# Patient Record
Sex: FEMALE | Race: WHITE | NOT HISPANIC OR LATINO | Employment: OTHER | URBAN - METROPOLITAN AREA
[De-identification: names, ages, dates, MRNs, and addresses within clinical notes are randomized per-mention and may not be internally consistent; named-entity substitution may affect disease eponyms.]

---

## 2018-03-09 ENCOUNTER — LAB REQUISITION (OUTPATIENT)
Dept: LAB | Facility: HOSPITAL | Age: 83
End: 2018-03-09

## 2018-03-09 DIAGNOSIS — Z00.00 ROUTINE GENERAL MEDICAL EXAMINATION AT A HEALTH CARE FACILITY: ICD-10-CM

## 2018-03-09 LAB
ALBUMIN SERPL-MCNC: 3.8 G/DL (ref 3.2–4.8)
ALBUMIN/GLOB SERPL: 1.5 G/DL (ref 1.5–2.5)
ALP SERPL-CCNC: 214 U/L (ref 25–100)
ALT SERPL W P-5'-P-CCNC: 16 U/L (ref 7–40)
ANION GAP SERPL CALCULATED.3IONS-SCNC: 8 MMOL/L (ref 3–11)
AST SERPL-CCNC: 20 U/L (ref 0–33)
BASOPHILS # BLD AUTO: 0.04 10*3/MM3 (ref 0–0.2)
BASOPHILS NFR BLD AUTO: 0.5 % (ref 0–1)
BILIRUB SERPL-MCNC: 0.2 MG/DL (ref 0.3–1.2)
BUN BLD-MCNC: 17 MG/DL (ref 9–23)
BUN/CREAT SERPL: 24.3 (ref 7–25)
CALCIUM SPEC-SCNC: 8.6 MG/DL (ref 8.7–10.4)
CHLORIDE SERPL-SCNC: 112 MMOL/L (ref 99–109)
CO2 SERPL-SCNC: 28 MMOL/L (ref 20–31)
CREAT BLD-MCNC: 0.7 MG/DL (ref 0.6–1.3)
DEPRECATED RDW RBC AUTO: 50.3 FL (ref 37–54)
EOSINOPHIL # BLD AUTO: 0.26 10*3/MM3 (ref 0–0.3)
EOSINOPHIL NFR BLD AUTO: 3.5 % (ref 0–3)
ERYTHROCYTE [DISTWIDTH] IN BLOOD BY AUTOMATED COUNT: 14.4 % (ref 11.3–14.5)
GFR SERPL CREATININE-BSD FRML MDRD: 79 ML/MIN/1.73
GFR SERPL CREATININE-BSD FRML MDRD: 95 ML/MIN/1.73
GLOBULIN UR ELPH-MCNC: 2.6 GM/DL
GLUCOSE BLD-MCNC: 98 MG/DL (ref 70–100)
HCT VFR BLD AUTO: 39.1 % (ref 34.5–44)
HGB BLD-MCNC: 12.2 G/DL (ref 11.5–15.5)
IMM GRANULOCYTES # BLD: 0.01 10*3/MM3 (ref 0–0.03)
IMM GRANULOCYTES NFR BLD: 0.1 % (ref 0–0.6)
LYMPHOCYTES # BLD AUTO: 2.31 10*3/MM3 (ref 0.6–4.8)
LYMPHOCYTES NFR BLD AUTO: 31 % (ref 24–44)
MCH RBC QN AUTO: 29.8 PG (ref 27–31)
MCHC RBC AUTO-ENTMCNC: 31.2 G/DL (ref 32–36)
MCV RBC AUTO: 95.4 FL (ref 80–99)
MONOCYTES # BLD AUTO: 0.59 10*3/MM3 (ref 0–1)
MONOCYTES NFR BLD AUTO: 7.9 % (ref 0–12)
NEUTROPHILS # BLD AUTO: 4.25 10*3/MM3 (ref 1.5–8.3)
NEUTROPHILS NFR BLD AUTO: 57 % (ref 41–71)
PLATELET # BLD AUTO: 328 10*3/MM3 (ref 150–450)
PMV BLD AUTO: 10.4 FL (ref 6–12)
POTASSIUM BLD-SCNC: 3.9 MMOL/L (ref 3.5–5.5)
PROT SERPL-MCNC: 6.4 G/DL (ref 5.7–8.2)
RBC # BLD AUTO: 4.1 10*6/MM3 (ref 3.89–5.14)
SODIUM BLD-SCNC: 148 MMOL/L (ref 132–146)
WBC NRBC COR # BLD: 7.46 10*3/MM3 (ref 3.5–10.8)

## 2018-03-09 PROCEDURE — 80053 COMPREHEN METABOLIC PANEL: CPT

## 2018-03-09 PROCEDURE — 85025 COMPLETE CBC W/AUTO DIFF WBC: CPT

## 2018-04-10 ENCOUNTER — APPOINTMENT (OUTPATIENT)
Dept: GENERAL RADIOLOGY | Facility: HOSPITAL | Age: 83
End: 2018-04-10

## 2018-04-10 ENCOUNTER — HOSPITAL ENCOUNTER (INPATIENT)
Facility: HOSPITAL | Age: 83
LOS: 5 days | Discharge: NURSING, REHAB OR LONG-TERM CARE | End: 2018-04-15
Attending: EMERGENCY MEDICINE | Admitting: INTERNAL MEDICINE

## 2018-04-10 DIAGNOSIS — S72.141A CLOSED DISPLACED INTERTROCHANTERIC FRACTURE OF RIGHT FEMUR, INITIAL ENCOUNTER (HCC): ICD-10-CM

## 2018-04-10 DIAGNOSIS — Z74.09 IMPAIRED MOBILITY AND ADLS: ICD-10-CM

## 2018-04-10 DIAGNOSIS — I10 ESSENTIAL HYPERTENSION: ICD-10-CM

## 2018-04-10 DIAGNOSIS — Z86.79 HISTORY OF CORONARY ARTERY DISEASE: ICD-10-CM

## 2018-04-10 DIAGNOSIS — Z78.9 IMPAIRED MOBILITY AND ADLS: ICD-10-CM

## 2018-04-10 DIAGNOSIS — D64.9 ANEMIA, UNSPECIFIED TYPE: ICD-10-CM
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
## 2018-04-10 DIAGNOSIS — Z74.09 IMPAIRED FUNCTIONAL MOBILITY, BALANCE, GAIT, AND ENDURANCE: ICD-10-CM

## 2018-04-10 DIAGNOSIS — S72.001A CLOSED FRACTURE OF RIGHT HIP, INITIAL ENCOUNTER (HCC): ICD-10-CM

## 2018-04-10 PROBLEM — S72.009A HIP FRACTURE (HCC): Status: ACTIVE | Noted: 2018-04-10

## 2018-04-10 LAB
ALBUMIN SERPL-MCNC: 3.8 G/DL (ref 3.2–4.8)
ALBUMIN/GLOB SERPL: 1.5 G/DL (ref 1.5–2.5)
ALP SERPL-CCNC: 134 U/L (ref 25–100)
ALT SERPL W P-5'-P-CCNC: 22 U/L (ref 7–40)
ANION GAP SERPL CALCULATED.3IONS-SCNC: 8 MMOL/L (ref 3–11)
AST SERPL-CCNC: 27 U/L (ref 0–33)
BASOPHILS # BLD AUTO: 0.03 10*3/MM3 (ref 0–0.2)
BASOPHILS NFR BLD AUTO: 0.4 % (ref 0–1)
BILIRUB SERPL-MCNC: 0.3 MG/DL (ref 0.3–1.2)
BUN BLD-MCNC: 13 MG/DL (ref 9–23)
BUN/CREAT SERPL: 14.4 (ref 7–25)
CALCIUM SPEC-SCNC: 8.2 MG/DL (ref 8.7–10.4)
CHLORIDE SERPL-SCNC: 108 MMOL/L (ref 99–109)
CO2 SERPL-SCNC: 24 MMOL/L (ref 20–31)
CREAT BLD-MCNC: 0.9 MG/DL (ref 0.6–1.3)
DEPRECATED RDW RBC AUTO: 46.5 FL (ref 37–54)
EOSINOPHIL # BLD AUTO: 0.1 10*3/MM3 (ref 0–0.3)
EOSINOPHIL NFR BLD AUTO: 1.3 % (ref 0–3)
ERYTHROCYTE [DISTWIDTH] IN BLOOD BY AUTOMATED COUNT: 13.6 % (ref 11.3–14.5)
GFR SERPL CREATININE-BSD FRML MDRD: 59 ML/MIN/1.73
GLOBULIN UR ELPH-MCNC: 2.5 GM/DL
GLUCOSE BLD-MCNC: 196 MG/DL (ref 70–100)
HCT VFR BLD AUTO: 36.3 % (ref 34.5–44)
HGB BLD-MCNC: 11.6 G/DL (ref 11.5–15.5)
IMM GRANULOCYTES # BLD: 0.03 10*3/MM3 (ref 0–0.03)
IMM GRANULOCYTES NFR BLD: 0.4 % (ref 0–0.6)
LYMPHOCYTES # BLD AUTO: 2.32 10*3/MM3 (ref 0.6–4.8)
LYMPHOCYTES NFR BLD AUTO: 30.4 % (ref 24–44)
MCH RBC QN AUTO: 29.7 PG (ref 27–31)
MCHC RBC AUTO-ENTMCNC: 32 G/DL (ref 32–36)
MCV RBC AUTO: 93.1 FL (ref 80–99)
MONOCYTES # BLD AUTO: 0.46 10*3/MM3 (ref 0–1)
MONOCYTES NFR BLD AUTO: 6 % (ref 0–12)
NEUTROPHILS # BLD AUTO: 4.68 10*3/MM3 (ref 1.5–8.3)
NEUTROPHILS NFR BLD AUTO: 61.5 % (ref 41–71)
PLATELET # BLD AUTO: 208 10*3/MM3 (ref 150–450)
PMV BLD AUTO: 10.2 FL (ref 6–12)
POTASSIUM BLD-SCNC: 3.7 MMOL/L (ref 3.5–5.5)
PROT SERPL-MCNC: 6.3 G/DL (ref 5.7–8.2)
RBC # BLD AUTO: 3.9 10*6/MM3 (ref 3.89–5.14)
SODIUM BLD-SCNC: 140 MMOL/L (ref 132–146)
WBC NRBC COR # BLD: 7.62 10*3/MM3 (ref 3.5–10.8)

## 2018-04-10 PROCEDURE — 99223 1ST HOSP IP/OBS HIGH 75: CPT | Performed by: HOSPITALIST

## 2018-04-10 PROCEDURE — 71045 X-RAY EXAM CHEST 1 VIEW: CPT

## 2018-04-10 PROCEDURE — 25010000002 ONDANSETRON PER 1 MG: Performed by: EMERGENCY MEDICINE

## 2018-04-10 PROCEDURE — 93005 ELECTROCARDIOGRAM TRACING: CPT | Performed by: EMERGENCY MEDICINE

## 2018-04-10 PROCEDURE — 73502 X-RAY EXAM HIP UNI 2-3 VIEWS: CPT

## 2018-04-10 PROCEDURE — 86900 BLOOD TYPING SEROLOGIC ABO: CPT

## 2018-04-10 PROCEDURE — 99285 EMERGENCY DEPT VISIT HI MDM: CPT

## 2018-04-10 PROCEDURE — 85025 COMPLETE CBC W/AUTO DIFF WBC: CPT | Performed by: EMERGENCY MEDICINE

## 2018-04-10 PROCEDURE — 25010000002 MORPHINE SULFATE (PF) 2 MG/ML SOLUTION: Performed by: EMERGENCY MEDICINE

## 2018-04-10 PROCEDURE — 86901 BLOOD TYPING SEROLOGIC RH(D): CPT

## 2018-04-10 PROCEDURE — 80053 COMPREHEN METABOLIC PANEL: CPT | Performed by: EMERGENCY MEDICINE

## 2018-04-10 RX ORDER — POLYETHYLENE GLYCOL 3350 17 G/17G
17 POWDER, FOR SOLUTION ORAL DAILY
COMMUNITY

## 2018-04-10 RX ORDER — ACETAMINOPHEN 325 MG/1
650 TABLET ORAL EVERY 4 HOURS PRN
Status: DISCONTINUED | OUTPATIENT
Start: 2018-04-10 | End: 2018-04-15 | Stop reason: HOSPADM

## 2018-04-10 RX ORDER — ONDANSETRON 2 MG/ML
4 INJECTION INTRAMUSCULAR; INTRAVENOUS ONCE
Status: COMPLETED | OUTPATIENT
Start: 2018-04-10 | End: 2018-04-10

## 2018-04-10 RX ORDER — MORPHINE SULFATE 2 MG/ML
2 INJECTION, SOLUTION INTRAMUSCULAR; INTRAVENOUS ONCE
Status: COMPLETED | OUTPATIENT
Start: 2018-04-10 | End: 2018-04-10

## 2018-04-10 RX ORDER — HYDROCODONE BITARTRATE AND ACETAMINOPHEN 5; 325 MG/1; MG/1
1 TABLET ORAL EVERY 6 HOURS PRN
Status: DISCONTINUED | OUTPATIENT
Start: 2018-04-10 | End: 2018-04-15 | Stop reason: HOSPADM

## 2018-04-10 RX ORDER — ONDANSETRON 2 MG/ML
4 INJECTION INTRAMUSCULAR; INTRAVENOUS EVERY 6 HOURS PRN
Status: DISCONTINUED | OUTPATIENT
Start: 2018-04-10 | End: 2018-04-11 | Stop reason: SDUPTHER

## 2018-04-10 RX ORDER — ATORVASTATIN CALCIUM 20 MG/1
20 TABLET, FILM COATED ORAL NIGHTLY
Status: DISCONTINUED | OUTPATIENT
Start: 2018-04-10 | End: 2018-04-15 | Stop reason: HOSPADM

## 2018-04-10 RX ORDER — SODIUM CHLORIDE 9 MG/ML
50 INJECTION, SOLUTION INTRAVENOUS CONTINUOUS
Status: DISCONTINUED | OUTPATIENT
Start: 2018-04-10 | End: 2018-04-11 | Stop reason: SDUPTHER

## 2018-04-10 RX ORDER — ONDANSETRON 4 MG/1
4 TABLET, FILM COATED ORAL EVERY 6 HOURS PRN
Status: DISCONTINUED | OUTPATIENT
Start: 2018-04-10 | End: 2018-04-11 | Stop reason: SDUPTHER

## 2018-04-10 RX ORDER — BISACODYL 10 MG
10 SUPPOSITORY, RECTAL RECTAL DAILY PRN
Status: DISCONTINUED | OUTPATIENT
Start: 2018-04-10 | End: 2018-04-15 | Stop reason: HOSPADM

## 2018-04-10 RX ORDER — MULTIPLE VITAMINS W/ MINERALS TAB 9MG-400MCG
1 TAB ORAL DAILY
Status: DISCONTINUED | OUTPATIENT
Start: 2018-04-11 | End: 2018-04-15 | Stop reason: HOSPADM

## 2018-04-10 RX ORDER — HYDROCODONE BITARTRATE AND ACETAMINOPHEN 5; 325 MG/1; MG/1
1 TABLET ORAL EVERY 6 HOURS PRN
Status: ON HOLD | COMMUNITY
End: 2018-04-15

## 2018-04-10 RX ORDER — DOCUSATE SODIUM 100 MG/1
100 CAPSULE, LIQUID FILLED ORAL 2 TIMES DAILY
Status: DISCONTINUED | OUTPATIENT
Start: 2018-04-10 | End: 2018-04-15 | Stop reason: HOSPADM

## 2018-04-10 RX ORDER — CLOPIDOGREL BISULFATE 75 MG/1
75 TABLET ORAL DAILY
COMMUNITY

## 2018-04-10 RX ORDER — ATENOLOL 50 MG/1
50 TABLET ORAL DAILY
COMMUNITY

## 2018-04-10 RX ORDER — ATENOLOL 50 MG/1
50 TABLET ORAL DAILY
Status: DISCONTINUED | OUTPATIENT
Start: 2018-04-11 | End: 2018-04-15 | Stop reason: HOSPADM

## 2018-04-10 RX ORDER — ATORVASTATIN CALCIUM 20 MG/1
20 TABLET, FILM COATED ORAL DAILY
COMMUNITY

## 2018-04-10 RX ADMIN — MORPHINE SULFATE 2 MG: 10 INJECTION INTRAVENOUS at 19:23

## 2018-04-10 RX ADMIN — HYDROCODONE BITARTRATE AND ACETAMINOPHEN 1 TABLET: 5; 325 TABLET ORAL at 23:12

## 2018-04-10 RX ADMIN — SODIUM CHLORIDE 50 ML/HR: 9 INJECTION, SOLUTION INTRAVENOUS at 22:30

## 2018-04-10 RX ADMIN — ATORVASTATIN CALCIUM 20 MG: 40 TABLET, FILM COATED ORAL at 23:12

## 2018-04-10 RX ADMIN — DOCUSATE SODIUM 100 MG: 100 CAPSULE, LIQUID FILLED ORAL at 23:11

## 2018-04-10 RX ADMIN — ONDANSETRON 4 MG: 2 INJECTION INTRAMUSCULAR; INTRAVENOUS at 19:26
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
## 2018-04-11 ENCOUNTER — APPOINTMENT (OUTPATIENT)
Dept: GENERAL RADIOLOGY | Facility: HOSPITAL | Age: 83
End: 2018-04-11

## 2018-04-11 ENCOUNTER — ANESTHESIA EVENT (OUTPATIENT)
Dept: PERIOP | Facility: HOSPITAL | Age: 83
End: 2018-04-11

## 2018-04-11 ENCOUNTER — ANESTHESIA (OUTPATIENT)
Dept: PERIOP | Facility: HOSPITAL | Age: 83
End: 2018-04-11

## 2018-04-11 LAB
ABO GROUP BLD: NORMAL
ABO GROUP BLD: NORMAL
ALBUMIN SERPL-MCNC: 3.3 G/DL (ref 3.2–4.8)
ALBUMIN/GLOB SERPL: 1.4 G/DL (ref 1.5–2.5)
ALP SERPL-CCNC: 114 U/L (ref 25–100)
ALT SERPL W P-5'-P-CCNC: 23 U/L (ref 7–40)
ANION GAP SERPL CALCULATED.3IONS-SCNC: 5 MMOL/L (ref 3–11)
AST SERPL-CCNC: 21 U/L (ref 0–33)
BASOPHILS # BLD AUTO: 0.02 10*3/MM3 (ref 0–0.2)
BASOPHILS NFR BLD AUTO: 0.2 % (ref 0–1)
BILIRUB SERPL-MCNC: 0.3 MG/DL (ref 0.3–1.2)
BLD GP AB SCN SERPL QL: NEGATIVE
BUN BLD-MCNC: 16 MG/DL (ref 9–23)
BUN/CREAT SERPL: 22.9 (ref 7–25)
CA-I SERPL ISE-MCNC: 1.25 MMOL/L (ref 1.12–1.32)
CALCIUM SPEC-SCNC: 8 MG/DL (ref 8.7–10.4)
CHLORIDE SERPL-SCNC: 111 MMOL/L (ref 99–109)
CO2 SERPL-SCNC: 25 MMOL/L (ref 20–31)
CREAT BLD-MCNC: 0.7 MG/DL (ref 0.6–1.3)
DEPRECATED RDW RBC AUTO: 47.2 FL (ref 37–54)
EOSINOPHIL # BLD AUTO: 0.01 10*3/MM3 (ref 0–0.3)
EOSINOPHIL NFR BLD AUTO: 0.1 % (ref 0–3)
ERYTHROCYTE [DISTWIDTH] IN BLOOD BY AUTOMATED COUNT: 13.6 % (ref 11.3–14.5)
GFR SERPL CREATININE-BSD FRML MDRD: 79 ML/MIN/1.73
GLOBULIN UR ELPH-MCNC: 2.3 GM/DL
GLUCOSE BLD-MCNC: 130 MG/DL (ref 70–100)
HCT VFR BLD AUTO: 29.7 % (ref 34.5–44)
HGB BLD-MCNC: 9.4 G/DL (ref 11.5–15.5)
IMM GRANULOCYTES # BLD: 0.03 10*3/MM3 (ref 0–0.03)
IMM GRANULOCYTES NFR BLD: 0.3 % (ref 0–0.6)
INR PPP: 1.1 (ref 0.91–1.09)
LYMPHOCYTES # BLD AUTO: 2.36 10*3/MM3 (ref 0.6–4.8)
LYMPHOCYTES NFR BLD AUTO: 20.6 % (ref 24–44)
MCH RBC QN AUTO: 29.7 PG (ref 27–31)
MCHC RBC AUTO-ENTMCNC: 31.6 G/DL (ref 32–36)
MCV RBC AUTO: 93.7 FL (ref 80–99)
MONOCYTES # BLD AUTO: 0.76 10*3/MM3 (ref 0–1)
MONOCYTES NFR BLD AUTO: 6.6 % (ref 0–12)
NEUTROPHILS # BLD AUTO: 8.26 10*3/MM3 (ref 1.5–8.3)
NEUTROPHILS NFR BLD AUTO: 72.2 % (ref 41–71)
PLATELET # BLD AUTO: 186 10*3/MM3 (ref 150–450)
PMV BLD AUTO: 10.2 FL (ref 6–12)
POTASSIUM BLD-SCNC: 3.6 MMOL/L (ref 3.5–5.5)
PROT SERPL-MCNC: 5.6 G/DL (ref 5.7–8.2)
PROTHROMBIN TIME: 11.5 SECONDS (ref 9.6–11.5)
RBC # BLD AUTO: 3.17 10*6/MM3 (ref 3.89–5.14)
RH BLD: POSITIVE
RH BLD: POSITIVE
SODIUM BLD-SCNC: 141 MMOL/L (ref 132–146)
T&S EXPIRATION DATE: NORMAL
WBC NRBC COR # BLD: 11.44 10*3/MM3 (ref 3.5–10.8)

## 2018-04-11 PROCEDURE — 86923 COMPATIBILITY TEST ELECTRIC: CPT

## 2018-04-11 PROCEDURE — C1713 ANCHOR/SCREW BN/BN,TIS/BN: HCPCS | Performed by: ORTHOPAEDIC SURGERY

## 2018-04-11 PROCEDURE — 25010000002 FENTANYL CITRATE (PF) 100 MCG/2ML SOLUTION: Performed by: ANESTHESIOLOGY

## 2018-04-11 PROCEDURE — 99221 1ST HOSP IP/OBS SF/LOW 40: CPT | Performed by: ORTHOPAEDIC SURGERY

## 2018-04-11 PROCEDURE — 25010000002 PHENYLEPHRINE PER 1 ML: Performed by: ANESTHESIOLOGY

## 2018-04-11 PROCEDURE — 76001 HC FLUORO GREATER THAN 1 HOUR: CPT

## 2018-04-11 PROCEDURE — 86900 BLOOD TYPING SEROLOGIC ABO: CPT | Performed by: HOSPITALIST

## 2018-04-11 PROCEDURE — 25010000002 ROPIVACAINE PER 1 MG: Performed by: NURSE ANESTHETIST, CERTIFIED REGISTERED

## 2018-04-11 PROCEDURE — 27245 TREAT THIGH FRACTURE: CPT | Performed by: PHYSICIAN ASSISTANT

## 2018-04-11 PROCEDURE — 80053 COMPREHEN METABOLIC PANEL: CPT | Performed by: HOSPITALIST

## 2018-04-11 PROCEDURE — 86850 RBC ANTIBODY SCREEN: CPT | Performed by: HOSPITALIST

## 2018-04-11 PROCEDURE — 85025 COMPLETE CBC W/AUTO DIFF WBC: CPT | Performed by: HOSPITALIST

## 2018-04-11 PROCEDURE — 82330 ASSAY OF CALCIUM: CPT | Performed by: HOSPITALIST

## 2018-04-11 PROCEDURE — 36430 TRANSFUSION BLD/BLD COMPNT: CPT

## 2018-04-11 PROCEDURE — 99232 SBSQ HOSP IP/OBS MODERATE 35: CPT | Performed by: HOSPITALIST

## 2018-04-11 PROCEDURE — 86900 BLOOD TYPING SEROLOGIC ABO: CPT

## 2018-04-11 PROCEDURE — 25810000003 SODIUM CHLORIDE 0.9 % WITH KCL 20 MEQ 20-0.9 MEQ/L-% SOLUTION: Performed by: ORTHOPAEDIC SURGERY

## 2018-04-11 PROCEDURE — 27245 TREAT THIGH FRACTURE: CPT | Performed by: ORTHOPAEDIC SURGERY

## 2018-04-11 PROCEDURE — 85610 PROTHROMBIN TIME: CPT | Performed by: HOSPITALIST

## 2018-04-11 PROCEDURE — 86901 BLOOD TYPING SEROLOGIC RH(D): CPT | Performed by: HOSPITALIST

## 2018-04-11 PROCEDURE — 25010000002 PROPOFOL 10 MG/ML EMULSION: Performed by: ANESTHESIOLOGY

## 2018-04-11 PROCEDURE — 73552 X-RAY EXAM OF FEMUR 2/>: CPT

## 2018-04-11 PROCEDURE — 25010000003 CEFAZOLIN IN DEXTROSE 2-4 GM/100ML-% SOLUTION: Performed by: ANESTHESIOLOGY

## 2018-04-11 PROCEDURE — P9016 RBC LEUKOCYTES REDUCED: HCPCS

## 2018-04-11 PROCEDURE — 76000 FLUOROSCOPY <1 HR PHYS/QHP: CPT

## 2018-04-11 PROCEDURE — 0QS606Z REPOSITION RIGHT UPPER FEMUR WITH INTRAMEDULLARY INTERNAL FIXATION DEVICE, OPEN APPROACH: ICD-10-PCS | Performed by: ORTHOPAEDIC SURGERY

## 2018-04-11 DEVICE — SCRW LK STRDRV TI 5X38M STRL: Type: IMPLANTABLE DEVICE | Site: HIP | Status: FUNCTIONAL

## 2018-04-11 DEVICE — SCRW LK STRDRV TI 5X36MM STRL: Type: IMPLANTABLE DEVICE | Site: HIP | Status: FUNCTIONAL

## 2018-04-11 DEVICE — BLD FEM FIX HELI TFN ADV 85MM STRL: Type: IMPLANTABLE DEVICE | Site: HIP | Status: FUNCTIONAL

## 2018-04-11 DEVICE — IMPLANTABLE DEVICE: Type: IMPLANTABLE DEVICE | Site: HIP | Status: FUNCTIONAL

## 2018-04-11 RX ORDER — ROPIVACAINE HYDROCHLORIDE 5 MG/ML
INJECTION, SOLUTION EPIDURAL; INFILTRATION; PERINEURAL AS NEEDED
Status: DISCONTINUED | OUTPATIENT
Start: 2018-04-11 | End: 2018-04-11 | Stop reason: SURG

## 2018-04-11 RX ORDER — MAGNESIUM HYDROXIDE 1200 MG/15ML
LIQUID ORAL AS NEEDED
Status: DISCONTINUED | OUTPATIENT
Start: 2018-04-11 | End: 2018-04-11 | Stop reason: HOSPADM

## 2018-04-11 RX ORDER — DOCUSATE SODIUM 100 MG/1
100 CAPSULE, LIQUID FILLED ORAL 2 TIMES DAILY PRN
Status: DISCONTINUED | OUTPATIENT
Start: 2018-04-11 | End: 2018-04-15 | Stop reason: HOSPADM

## 2018-04-11 RX ORDER — CEFAZOLIN SODIUM 2 G/100ML
2 INJECTION, SOLUTION INTRAVENOUS EVERY 8 HOURS
Status: COMPLETED | OUTPATIENT
Start: 2018-04-12 | End: 2018-04-12

## 2018-04-11 RX ORDER — FAMOTIDINE 20 MG/1
20 TABLET, FILM COATED ORAL ONCE
Status: DISCONTINUED | OUTPATIENT
Start: 2018-04-11 | End: 2018-04-11 | Stop reason: HOSPADM

## 2018-04-11 RX ORDER — FENTANYL CITRATE 50 UG/ML
50 INJECTION, SOLUTION INTRAMUSCULAR; INTRAVENOUS
Status: DISCONTINUED | OUTPATIENT
Start: 2018-04-11 | End: 2018-04-11 | Stop reason: HOSPADM

## 2018-04-11 RX ORDER — ROPIVACAINE HYDROCHLORIDE 2 MG/ML
8 INJECTION, SOLUTION EPIDURAL; INFILTRATION CONTINUOUS
Status: DISCONTINUED | OUTPATIENT
Start: 2018-04-11 | End: 2018-04-11

## 2018-04-11 RX ORDER — HYDROMORPHONE HYDROCHLORIDE 1 MG/ML
0.5 INJECTION, SOLUTION INTRAMUSCULAR; INTRAVENOUS; SUBCUTANEOUS
Status: DISCONTINUED | OUTPATIENT
Start: 2018-04-11 | End: 2018-04-11 | Stop reason: HOSPADM

## 2018-04-11 RX ORDER — PANTOPRAZOLE SODIUM 40 MG/10ML
40 INJECTION, POWDER, LYOPHILIZED, FOR SOLUTION INTRAVENOUS
Status: DISCONTINUED | OUTPATIENT
Start: 2018-04-12 | End: 2018-04-11 | Stop reason: HOSPADM

## 2018-04-11 RX ORDER — PROMETHAZINE HYDROCHLORIDE 25 MG/ML
6.25 INJECTION, SOLUTION INTRAMUSCULAR; INTRAVENOUS ONCE AS NEEDED
Status: DISCONTINUED | OUTPATIENT
Start: 2018-04-11 | End: 2018-04-11 | Stop reason: HOSPADM

## 2018-04-11 RX ORDER — SODIUM CHLORIDE, SODIUM LACTATE, POTASSIUM CHLORIDE, CALCIUM CHLORIDE 600; 310; 30; 20 MG/100ML; MG/100ML; MG/100ML; MG/100ML
INJECTION, SOLUTION INTRAVENOUS CONTINUOUS PRN
Status: DISCONTINUED | OUTPATIENT
Start: 2018-04-11 | End: 2018-04-11 | Stop reason: SURG

## 2018-04-11 RX ORDER — ONDANSETRON 2 MG/ML
4 INJECTION INTRAMUSCULAR; INTRAVENOUS EVERY 6 HOURS PRN
Status: DISCONTINUED | OUTPATIENT
Start: 2018-04-11 | End: 2018-04-15 | Stop reason: HOSPADM

## 2018-04-11 RX ORDER — SODIUM CHLORIDE AND POTASSIUM CHLORIDE 150; 900 MG/100ML; MG/100ML
50 INJECTION, SOLUTION INTRAVENOUS CONTINUOUS
Status: DISCONTINUED | OUTPATIENT
Start: 2018-04-11 | End: 2018-04-15 | Stop reason: HOSPADM

## 2018-04-11 RX ORDER — ONDANSETRON 4 MG/1
4 TABLET, FILM COATED ORAL EVERY 6 HOURS PRN
Status: DISCONTINUED | OUTPATIENT
Start: 2018-04-11 | End: 2018-04-15 | Stop reason: HOSPADM

## 2018-04-11 RX ORDER — SODIUM CHLORIDE AND POTASSIUM CHLORIDE 150; 900 MG/100ML; MG/100ML
50 INJECTION, SOLUTION INTRAVENOUS CONTINUOUS
Status: DISCONTINUED | OUTPATIENT
Start: 2018-04-11 | End: 2018-04-11 | Stop reason: SDUPTHER

## 2018-04-11 RX ORDER — LIDOCAINE HYDROCHLORIDE 10 MG/ML
0.5 INJECTION, SOLUTION EPIDURAL; INFILTRATION; INTRACAUDAL; PERINEURAL ONCE AS NEEDED
Status: CANCELLED | OUTPATIENT
Start: 2018-04-11

## 2018-04-11 RX ORDER — SODIUM CHLORIDE, SODIUM LACTATE, POTASSIUM CHLORIDE, CALCIUM CHLORIDE 600; 310; 30; 20 MG/100ML; MG/100ML; MG/100ML; MG/100ML
9 INJECTION, SOLUTION INTRAVENOUS CONTINUOUS
Status: DISCONTINUED | OUTPATIENT
Start: 2018-04-11 | End: 2018-04-11 | Stop reason: SDUPTHER

## 2018-04-11 RX ORDER — FAMOTIDINE 20 MG/1
20 TABLET, FILM COATED ORAL ONCE
Status: CANCELLED | OUTPATIENT
Start: 2018-04-11 | End: 2018-04-11

## 2018-04-11 RX ORDER — GLYCOPYRROLATE 0.2 MG/ML
INJECTION INTRAMUSCULAR; INTRAVENOUS AS NEEDED
Status: DISCONTINUED | OUTPATIENT
Start: 2018-04-11 | End: 2018-04-11 | Stop reason: SURG

## 2018-04-11 RX ORDER — SODIUM CHLORIDE 0.9 % (FLUSH) 0.9 %
1-10 SYRINGE (ML) INJECTION AS NEEDED
Status: DISCONTINUED | OUTPATIENT
Start: 2018-04-11 | End: 2018-04-11 | Stop reason: HOSPADM

## 2018-04-11 RX ORDER — CEFAZOLIN SODIUM 2 G/100ML
2 INJECTION, SOLUTION INTRAVENOUS ONCE
Status: DISCONTINUED | OUTPATIENT
Start: 2018-04-11 | End: 2018-04-11 | Stop reason: HOSPADM

## 2018-04-11 RX ORDER — ROPIVACAINE HYDROCHLORIDE 2 MG/ML
8 INJECTION, SOLUTION EPIDURAL; INFILTRATION CONTINUOUS
Status: DISCONTINUED | OUTPATIENT
Start: 2018-04-11 | End: 2018-04-15 | Stop reason: HOSPADM

## 2018-04-11 RX ORDER — LIDOCAINE HYDROCHLORIDE 10 MG/ML
0.5 INJECTION, SOLUTION EPIDURAL; INFILTRATION; INTRACAUDAL; PERINEURAL ONCE AS NEEDED
Status: DISCONTINUED | OUTPATIENT
Start: 2018-04-11 | End: 2018-04-11 | Stop reason: HOSPADM

## 2018-04-11 RX ORDER — LIDOCAINE HYDROCHLORIDE 10 MG/ML
INJECTION, SOLUTION EPIDURAL; INFILTRATION; INTRACAUDAL; PERINEURAL AS NEEDED
Status: DISCONTINUED | OUTPATIENT
Start: 2018-04-11 | End: 2018-04-11 | Stop reason: SURG

## 2018-04-11 RX ORDER — SODIUM CHLORIDE, SODIUM LACTATE, POTASSIUM CHLORIDE, CALCIUM CHLORIDE 600; 310; 30; 20 MG/100ML; MG/100ML; MG/100ML; MG/100ML
9 INJECTION, SOLUTION INTRAVENOUS CONTINUOUS
Status: CANCELLED | OUTPATIENT
Start: 2018-04-11

## 2018-04-11 RX ORDER — PROPOFOL 10 MG/ML
VIAL (ML) INTRAVENOUS AS NEEDED
Status: DISCONTINUED | OUTPATIENT
Start: 2018-04-11 | End: 2018-04-11 | Stop reason: SURG

## 2018-04-11 RX ORDER — PROMETHAZINE HYDROCHLORIDE 25 MG/1
25 SUPPOSITORY RECTAL ONCE AS NEEDED
Status: DISCONTINUED | OUTPATIENT
Start: 2018-04-11 | End: 2018-04-11 | Stop reason: HOSPADM

## 2018-04-11 RX ORDER — PROMETHAZINE HYDROCHLORIDE 25 MG/1
25 TABLET ORAL ONCE AS NEEDED
Status: DISCONTINUED | OUTPATIENT
Start: 2018-04-11 | End: 2018-04-11 | Stop reason: HOSPADM

## 2018-04-11 RX ORDER — FENTANYL CITRATE 50 UG/ML
INJECTION, SOLUTION INTRAMUSCULAR; INTRAVENOUS AS NEEDED
Status: DISCONTINUED | OUTPATIENT
Start: 2018-04-11 | End: 2018-04-11 | Stop reason: SURG

## 2018-04-11 RX ORDER — CEFAZOLIN SODIUM 2 G/100ML
INJECTION, SOLUTION INTRAVENOUS AS NEEDED
Status: DISCONTINUED | OUTPATIENT
Start: 2018-04-11 | End: 2018-04-11 | Stop reason: SURG

## 2018-04-11 RX ORDER — SODIUM CHLORIDE 0.9 % (FLUSH) 0.9 %
1-10 SYRINGE (ML) INJECTION AS NEEDED
Status: DISCONTINUED | OUTPATIENT
Start: 2018-04-11 | End: 2018-04-15 | Stop reason: HOSPADM

## 2018-04-11 RX ADMIN — FENTANYL CITRATE 100 MCG: 50 INJECTION, SOLUTION INTRAMUSCULAR; INTRAVENOUS at 17:38

## 2018-04-11 RX ADMIN — ROPIVACAINE HYDROCHLORIDE 25 ML: 5 INJECTION, SOLUTION EPIDURAL; INFILTRATION; PERINEURAL at 15:10

## 2018-04-11 RX ADMIN — PHENYLEPHRINE HYDROCHLORIDE 400 MCG: 10 INJECTION INTRAVENOUS at 19:15

## 2018-04-11 RX ADMIN — CEFAZOLIN SODIUM 2 G: 2 INJECTION, SOLUTION INTRAVENOUS at 17:45

## 2018-04-11 RX ADMIN — HYDROCODONE BITARTRATE AND ACETAMINOPHEN 1 TABLET: 5; 325 TABLET ORAL at 07:20

## 2018-04-11 RX ADMIN — GLYCOPYRROLATE 0.2 MG: 0.2 INJECTION INTRAMUSCULAR; INTRAVENOUS at 17:57

## 2018-04-11 RX ADMIN — PHENYLEPHRINE HYDROCHLORIDE 100 MCG: 10 INJECTION INTRAVENOUS at 18:11

## 2018-04-11 RX ADMIN — PHENYLEPHRINE HYDROCHLORIDE 500 MCG: 10 INJECTION INTRAVENOUS at 18:52

## 2018-04-11 RX ADMIN — PROPOFOL 100 MG: 10 INJECTION, EMULSION INTRAVENOUS at 17:38

## 2018-04-11 RX ADMIN — DOCUSATE SODIUM 100 MG: 100 CAPSULE, LIQUID FILLED ORAL at 21:14

## 2018-04-11 RX ADMIN — SODIUM CHLORIDE, POTASSIUM CHLORIDE, SODIUM LACTATE AND CALCIUM CHLORIDE: 600; 310; 30; 20 INJECTION, SOLUTION INTRAVENOUS at 17:33

## 2018-04-11 RX ADMIN — PHENYLEPHRINE HYDROCHLORIDE 200 MCG: 10 INJECTION INTRAVENOUS at 17:41

## 2018-04-11 RX ADMIN — PHENYLEPHRINE HYDROCHLORIDE 100 MCG: 10 INJECTION INTRAVENOUS at 18:23

## 2018-04-11 RX ADMIN — PHENYLEPHRINE HYDROCHLORIDE 100 MCG: 10 INJECTION INTRAVENOUS at 17:54

## 2018-04-11 RX ADMIN — LIDOCAINE HYDROCHLORIDE 3 ML: 10 INJECTION, SOLUTION EPIDURAL; INFILTRATION; INTRACAUDAL; PERINEURAL at 15:05

## 2018-04-11 RX ADMIN — PHENYLEPHRINE HYDROCHLORIDE 300 MCG: 10 INJECTION INTRAVENOUS at 18:30

## 2018-04-11 RX ADMIN — SODIUM CHLORIDE, POTASSIUM CHLORIDE, SODIUM LACTATE AND CALCIUM CHLORIDE: 600; 310; 30; 20 INJECTION, SOLUTION INTRAVENOUS at 19:15

## 2018-04-11 RX ADMIN — SODIUM CHLORIDE, POTASSIUM CHLORIDE, SODIUM LACTATE AND CALCIUM CHLORIDE: 600; 310; 30; 20 INJECTION, SOLUTION INTRAVENOUS at 18:45

## 2018-04-11 RX ADMIN — SODIUM CHLORIDE, POTASSIUM CHLORIDE, SODIUM LACTATE AND CALCIUM CHLORIDE: 600; 310; 30; 20 INJECTION, SOLUTION INTRAVENOUS at 18:15

## 2018-04-11 RX ADMIN — ATORVASTATIN CALCIUM 20 MG: 40 TABLET, FILM COATED ORAL at 21:14

## 2018-04-11 RX ADMIN — GLYCOPYRROLATE 0.2 MG: 0.2 INJECTION INTRAMUSCULAR; INTRAVENOUS at 18:00

## 2018-04-11 RX ADMIN — POTASSIUM CHLORIDE AND SODIUM CHLORIDE 50 ML/HR: 900; 150 INJECTION, SOLUTION INTRAVENOUS at 21:14

## 2018-04-11 RX ADMIN — ROPIVACAINE HYDROCHLORIDE 8 ML/HR: 2 INJECTION, SOLUTION EPIDURAL; INFILTRATION at 20:13

## 2018-04-11 NOTE — PROGRESS NOTES
[facility] Medicine Services  PROGRESS NOTE    Patient Name: Leydi Agosto  : 1928  MRN: [id number]    Date of Admission: 4/10/2018  Length of Stay: 1  Primary Care Physician: Perez Pedraza MD    Subjective   Subjective     CC:  Mechanical fall/right hip fracture    HPI:  Alert, doing relatively well, pain well controlled. No CP or SOA.       Review of Systems  Otherwise ROS is negative except as mentioned in the HPI.    Objective   Objective     Vital Signs:   Temp:  [97.8 °F (36.6 °C)-98.6 °F (37 °C)] 98 °F (36.7 °C)  Heart Rate:  [60-77] 67  Resp:  [16-18] 16  BP: ()/(50-86) 116/50        Physical Exam:  General Assessment: No acute cardiopulmonary distress.     Neck: Supple    CVS: RRR, S1S2 normal    Resp: CTAB, no adventitious sound    Abd: soft, NT, ND, normal BS, no guarding or peritoneal signs    Ext: No edema, both calves are symmetric and NTTP. + ecchymosis over right hip/tender to palpation.    Neuro: No facial asymmetry, speech clear    Skin: W/D/I. No rash.    Psych: Affect is appropriate    Results Reviewed:  I have personally reviewed current lab, radiology, and data and agree.      Results from last 7 days  Lab Units 18  0650 18  0649 04/10/18  1928   WBC 10*3/mm3 11.44*  --  7.62   HEMOGLOBIN g/dL 9.4*  --  11.6   HEMATOCRIT % 29.7*  --  36.3   PLATELETS 10*3/mm3 186  --  208   INR   --  1.10*  --        Results from last 7 days  Lab Units 18  0649 04/10/18  1928   SODIUM mmol/L 141 140   POTASSIUM mmol/L 3.6 3.7   CHLORIDE mmol/L 111* 108   CO2 mmol/L 25.0 24.0   BUN mg/dL 16 13   CREATININE mg/dL 0.70 0.90   GLUCOSE mg/dL 130* 196*   CALCIUM mg/dL 8.0* 8.2*   ALT (SGPT) U/L 23 22   AST (SGOT) U/L 21 27     Estimated Creatinine Clearance: 42.7 mL/min (by C-G formula based on SCr of 0.7 mg/dL).  No results found for: BNP  No results found for: PHART    Microbiology Results Abnormal     None          Imaging Results (last 24 hours)      Procedure Component Value Units Date/Time    XR Femur 2 View Right [[id number]] Collected:  04/11/18 1117     Updated:  04/11/18 1117    Narrative:       EXAMINATION: XR FEMUR 2 VW, RIGHT-04/11/2018:      INDICATION: Evaluate femur/total knee implant; W19.XXXA-Unspecified  fall, initial encounter; S72.001A-Fracture of unspecified part of neck  of right femur, initial encounter for closed fracture; Z86.79-Personal  history of other diseases of the circulatory system; I10-Essential  (primary) hypertension; S72.141A-Displaced intertrochanteric fracture of  right femur, initial encounter for closed fracture.      COMPARISON: NONE.     FINDINGS: Two views of the right femur reveal hardware seen in the  distal femur from total knee arthroplasty. The tibial hardware has been  removed. There is fracture seen of the proximal femoral shaft in the  subtrochanteric portion. No significant change in the alignment. Soft  tissue swelling identified. Vascular calcification seen within the soft  tissues.           Impression:       Subtrochanteric fracture seen of the right femur. There is  hardware seen in the distal femur from knee arthroplasty.     D:  04/11/2018  E:  04/11/2018             XR Hip With or Without Pelvis 2 - 3 View Right [[id number]] Collected:  04/10/18 1906     Updated:  04/10/18 2050    Narrative:       EXAM:    XR Right Hip With Pelvis When Performed, 2 or 3 Views    CLINICAL HISTORY:    89 years old, female; Pain;  trauma, initial encounter, Hip pain; Right hip;   Additional info: Fall injury with external rotation/shortening of rle    TECHNIQUE:    Two or three views of the right hip, with pelvis when performed.    COMPARISON:    No relevant prior studies available.    FINDINGS:    Bones/joints:  Acute, superomedially displaced, mildly comminuted oblique   right femoral fracture through the inferior trochanter to the subtrochanteric   region laterally.  Old, healed left inferior and superior pubic rami  fractures.    No dislocation.    Soft tissues: Pelvic phleboliths.    Gastrointestinal tract:  Moderate amount of stool in the colon, correlate for   constipation.      Impression:       1.  Acute, superomedially displaced, mildly comminuted, oblique right femoral   fracture through the inferior trochanter to the subtrochanteric region   laterally.  2.  Moderate amount of stool in the colon, correlate for constipation.    THIS DOCUMENT HAS BEEN ELECTRONICALLY SIGNED BY RAMIN SOW MD    XR Chest 1 View [[id number]] Collected:  04/10/18 1907     Updated:  04/10/18 2038    Narrative:       EXAM:    XR Chest, 1 View    CLINICAL HISTORY:    89 years old, female; Signs and symptoms; Other: Probable hip FX; Pre-op    TECHNIQUE:    Frontal view of the chest.    COMPARISON:    No relevant prior studies available.    FINDINGS:    Lungs:  Unremarkable.  No consolidation.    Pleural space:  Unremarkable.  No pneumothorax.    Heart:  Unremarkable.  No cardiomegaly.    Mediastinum:  Unremarkable.    Bones/joints:  Thoracolumbar vertebroplasty.  Old, callused left lateral rib   fractures with mild chest wall deformity.  Dextrocurvature of the thoracic   spine, positioning versus mild scoliosis.      Impression:         No acute abnormality.    THIS DOCUMENT HAS BEEN ELECTRONICALLY SIGNED BY RAMIN SOW MD             I have reviewed the medications.    Assessment/Plan   Assessment / Plan     Hospital Problem List     Hip fracture    Closed displaced intertrochanteric fracture of right femur             Brief Hospital Course to date:  Leydi Agosto is a 89 y.o. female with history of HTN and CAD/remote MI, who is admitted with right hip fracture after a mechanical fall.      Assessment & Plan:  - Dr Mccurdy has already evaluated the pt and plans to do surgery today.  - CAD stable/asymptomatic, no further workup required at this time.  - Activity level after surgery and chem DVT ppx per ortho  - repeat CBC/BMP in am  - PT/OT  consulted  - CM for DC planning. Pt lives alone at [facility].    DVT Prophylaxis:  Mechanical only for now, anticipate surgery. Chem DVT ppx after surgery per ortho.     CODE STATUS: Full Code    Disposition: Likely back to [facility] once stable.      Electronically signed by Jane Iraheta MD, 04/11/18, 4:22 PM.

## 2018-04-11 NOTE — ANESTHESIA POSTPROCEDURE EVALUATION
Patient: Leydi Agosto    Procedure Summary     Date:  04/11/18 Room / [facility]:   [facility]    Anesthesia Start:  1734 Anesthesia Stop:      Procedure:  HIP TROCANTERIC NAILING WITH INTRAMEDULLARY HIP SCREW (Right Hip) Diagnosis:      Surgeon:  Elie Walsh MD Provider:  Kameron Mcginnis MD    Anesthesia Type:  general, regional ASA Status:  3          Anesthesia Type: general, regional  Last vitals  BP   116/50 (04/11/18 1542)   Temp   98 °F (36.7 °C) (04/11/18 1542)   Pulse   67 (04/11/18 1542)   Resp   16 (04/11/18 1542)     SpO2   96 % (04/11/18 1542)     Post Anesthesia Care and Evaluation    Patient location during evaluation: PACU  Patient participation: complete - patient participated  Level of consciousness: awake and alert  Pain score: 0  Pain management: adequate  Airway patency: patent  Anesthetic complications: No anesthetic complications  PONV Status: none  Cardiovascular status: hemodynamically stable and acceptable  Respiratory status: nonlabored ventilation, acceptable and nasal cannula  Hydration status: acceptable

## 2018-04-11 NOTE — ANESTHESIA PROCEDURE NOTES
FICB Catheter    Patient location during procedure: pre-op  Reason for block: procedure for pain and at surgeon's request  Performed by  CRNA: ELIZABETH ANDREA  Assisted by: LEVI LYONS  Preanesthetic Checklist  Completed: patient identified, site marked, surgical consent, pre-op evaluation, timeout performed, IV checked, risks and benefits discussed and monitors and equipment checked  Prep:  Pt Position: supine  Sterile barriers:cap, gloves and mask  Prep: ChloraPrep  Patient monitoring: blood pressure monitoring, continuous pulse oximetry and EKG  Procedure  Sedation:no    Guidance:ultrasound guided  Images:still images obtained    Laterality:right  Block Type:fascia iliaca catheter  Injection Technique:catheter  Needle Type:echogenic  Needle Gauge:18 G    Catheter Size:20 G (20g)  Cath Depth at skin: 14 cm  Medications  Preservative Free Saline:10ml  Comment:NS 0.9% 25 ml  Local Injected:ropivacaine 0.5% Local Amount Injected:25mL  Post Assessment  Injection Assessment: negative aspiration for heme, no paresthesia on injection and incremental injection  Patient Tolerance:comfortable throughout block  Complications:no  Additional Notes  Procedure:                 Pt placed in supine position.   The insertion site was prepped in sterile fashion with Chlorapreop and clear plastic drapes.  Analgesia was provided by skin infiltration at insertion site with Lidocaine 1% 3mls.  A B-Galarza 18 g , 4 inch echogenic Touhy needle was advance In-plane under ultrasound guidance. The   Anterior superior Iliac crest was initially visualized and the probe was directed slightly medially and slightly towards the umbilicus.  The course of the needle was tracked over the sartorius muscle through the fascia Iliacus and into the anterior portion of the Iliacus muscle.  Major vessels where identified and avoided as where structures of the peritoneal cavity.  LA injection was made incrementally in 1-5ml amounts spread was visualized  superiorly below fascia iliacus.  Injection was completed with negative aspiration of blood and negative intravascular injection.  Injection pressures where normal or minimal resistance.  A 20 g B-Galarza wire styleted catheter was then advance thru the needle and very easily placed in a superior or cephalad direction.  The catheter was secured at insertion site with skin afix , mastisol, steristreps.  A CHG tegaderm dressing was placed over the insertion site and the nerve catheter labeled and capped.  Thank You.

## 2018-04-11 NOTE — OP NOTE
Orthopaedics Operative Note    PREOPERATIVE DIAGNOSIS:  Right reverse obliquity intertrochanteric femur fracture    POSTOPERATIVE DIAGNOSIS:  same    PROCEDURE PERFORMED:  Open treatment right intertrochanteric femur fracture using intramedullary hip screw.  CPT 88131    SURGEON:  Elie Walsh MD    ASSISTANT:  Shanika Todd PA-C    ANESTHESIA:  General    ANESTHESIOLOGIST: Nataliia    IMPLANTS:  Synthes TFNa long nail   An 11mm x 360mm x 125° long nail.   85 mm blade.   36 and 38 mm distal interlocking screws.     COMPLICATIONS:  None apparent.    ESTIMATED BLOOD LOSS:  500 mL.    INDICATIONS:  Pain, Facilitate mobility    DESCRIPTION OF THE PROCEDURE:  After informed consent was obtained, the patient was taken to the operating room. After the smooth induction of general anesthesia, the patient was given a dose of IV Kefzol. We then gently positioned the patient on the [place] table taking care to pad all bony prominences.The well leg was placed in a padded leg sanchez. A timeout was performed to verify the site and procedure to be performed.  The right lower extremity was then placed in traction with slight internal rotation. A C-arm was brought in to verify appropriate reduction. We then sterilely prepped and draped the right lower extremity in the usual fashion for this type of procedure.  We then began with a lateral incision through the IT band and Vastus Lateralis and placement of a bone hook around the femoral shaft to assist with reduction of the medialized femoral shaft.  We then proceeded with percutaneous entry of a guide pin into the tip of the greater trochanter. This was advanced to the level of the lesser trochanter and confirmed to be in the appropriate position using C-arm. We then cut down on the wire and then used our 16mm large bore reamer to gain access to the intramedullary canal. We then removed the guide pin and placed a ball-tip guide wire into the intramedullary canal of the left  femur. We then verified that our guidewire was appropriately placed and our reduction maintained. The fracture was manipulated appropriately until appropriate reduction was achieved.   Reaming the intramedullary canal further with pressure sentinel reamers to 12.5 mm was then performed.  We then placed the final device into the intramedullary canal, malleting it gently to the appropriate level. We then made a small stab incision on the proximal lateral thigh. The trochar was placed along the lateral cortex of the femur, the ball tip guidewire was removed,  and a partially-threaded guide pin was placed into the center-center position in the femoral head and neck.   Once this pin was appropriately placed, we used an indirect measurement technique to select our blade size. We then used a 10 mm opening drill bit for the lateral cortex.. The blade was then placed without difficulty into the subchondral bone of the femoral neck and head attempting to leave a tip apex distance of less than 25 mm.  The locking screw was then placed through the top of the nail and maximally tightened. We then verified with multiple C-arm images that we had an appropriate reduction and placement of the proximal implants. The distal interlocking screws were then placed using perfect [place] technique without difficulty. We then thoroughly irrigated our incisions and then removed the insertion handle. We closed our deep fascia proximally using 0 Ethibond in an interrupted fashion. Our subcutaneous layer was closed using 2-0 Vicryl in an interrupted fashion. Our skin was closed using staples. Aquacel dressings were then applied to the incisions. The patient was taken out of traction and placed back into their hospital bed after the drape was removed in stable condition. All counts were correct postoperatively. I performed the case. Shanika Todd was present and necessary for all portions of the case.      POSTOPERATIVE PLAN:  1. Weight  bearing status: TDWB RLE.   2.  Low dose Lovenox for DVT prophylaxis  3.  24 hours of IV Kefzol.  4.  [facility]  5.  PT/OT in the morning    Elie Walsh M.D.
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
## 2018-04-11 NOTE — PROGRESS NOTES
Discharge Planning Assessment  [facility]     Patient Name: Leydi Agosto  MRN: [id number]  Today's Date: 4/11/2018    Admit Date: 4/10/2018          Discharge Needs Assessment     Row Name 04/11/18 0849       Living Environment    Lives With other (see comments)    Unique Family Situation Lives at [facility] assisted living    Current Living Arrangements independent/assisted living facility    Primary Care Provided by self    Provides Primary Care For no one    Family Caregiver if Needed child(nory), adult    Family Caregiver Names Leeann Robledo    Quality of Family Relationships helpful;involved    Able to Return to Prior Arrangements yes       Transition Planning    Patient/Family Anticipates Transition to home with help/services;inpatient rehabilitation facility    Patient/Family Anticipated Services at Transition home health care;rehabilitation services    Transportation Anticipated family or friend will provide       Discharge Needs Assessment    Readmission Within the Last 30 Days no previous admission in last 30 days    Concerns to be Addressed discharge planning    Equipment Currently Used at Home walker, rolling    Anticipated Changes Related to Illness inability to care for self    Equipment Needed After Discharge none    Outpatient/Agency/Support Group Needs homecare agency;skilled nursing facility    Offered/Gave Vendor List yes    Patient's Choice of Community Agency(s) [facility] vs             Discharge Plan     Row Name 04/11/18 0851       Plan    Plan home with HH vs rehab pending PT eval    Patient/Family in Agreement with Plan yes    Plan Comments Pt lives at [facility] assisted living. She uses a rolling walker for mobility but reports she is indpendent with her ADLs. She is followed by her PCP and her medications are covered by insurnace. At this time her goal for discharge is either to return to her assisted living or to go to rehab at [facility] pending PT eval. CM to follow.         Destination     No service coordination in this encounter.      Durable Medical Equipment     No service coordination in this encounter.      Dialysis/Infusion     No service coordination in this encounter.      Home Medical Care     No service coordination in this encounter.      Social Care     No service coordination in this encounter.                Demographic Summary     Row Name 04/11/18 0848       General Information    Admission Type inpatient    Referral Source physician    Reason for Consult discharge planning    Preferred Language English    General Information Comments Dr Pedraza in PCP       Contact Information    Permission Granted to Share Info With ;family/designee;facility     Contact Information Comments Leeann Robledo [phone]            Functional Status     Row Name 04/11/18 0849       Functional Status, IADL    Medications independent    Meal Preparation assistive person    Housekeeping assistive person    Laundry assistive person    Shopping independent       Mental Status    General Appearance WDL WDL       Mental Status Summary    Recent Changes in Mental Status/Cognitive Functioning no changes       Employment/    Employment Status retired            Psychosocial    No documentation.           Abuse/Neglect    No documentation.           Legal    No documentation.           Substance Abuse    No documentation.           Patient Forms    No documentation.         Kathie Delgado RN

## 2018-04-11 NOTE — ANESTHESIA PROCEDURE NOTES
Airway  Urgency: elective    Airway not difficult    General Information and Staff    Patient location during procedure: OR  Anesthesiologist: PATT ABEL    Indications and Patient Condition  Indications for airway management: airway protection    Preoxygenated: yes  Mask difficulty assessment: 1 - vent by mask    Final Airway Details  Final airway type: supraglottic airway      Successful airway: I-gel  Size 4    Number of attempts at approach: 1    Additional Comments  LMA placed without difficulty, ventilation with assist, equal breath sounds and symmetric chest rise and fall

## 2018-04-11 NOTE — CONSULTS
"Clinical Nutrition   Reason For Visit: Identified at risk by screening criteria, MST score 2+, Unintentional weight loss    Patient Name: Leydi Agosto  YOB: 1928  MRN: [id number]  Date of Encounter: 04/11/18 12:35 PM  Admission date: 4/10/2018        Nutrition Assessment     Hospital Problem List  Active Problems:    Hip fracture    Closed displaced intertrochanteric fracture of right femur      PMH: She  has a past medical history of Coronary artery disease and Hypertension.   PSxH: She  has a past surgical history that includes Joint replacement.           Reported/Observed/Food/Nutrition Related History     Patient reports she had a good appetite/PO intake prior to moving from [facility] to her current facility \"months\" ago. Since her move, she has not been as happy and the food is not as good, so she suspects she has eaten less and lost some weight. Unsure of amount or exact time frame. Dislikes fresh tomatoes, okay with cooked. Declines oral nutrition supplements at this time.      Anthropometrics   Height: 63 in  Weight: 125 lbs (bedscale wt 4/10 per nsg documentation - unable to obtain standing wt 2/2 hip fx)  BMI: 22.2  BMI classification: Normal: 18.5-24.9kg/m2   UBW: 140 lbs (per pt) RD notes no weight hx available in EMR  Weight change: possible unintentional wt loss of 15 lbs within an unknown timeframe        Labs reviewed   Labs reviewed: Yes      Medications reviewed   Medications reviewed: Yes      Current Nutrition Prescription   PO: NPO Diet    Evaluation of Received Nutrient/Fluid Intake: insufficient data (NPO since admission)      Nutrition Diagnosis     Problem Inadequate oral intake   Etiology Poor appetite   Signs/Symptoms Patient report of decreased PO intake since transferring to current nursing home facility; current NPO status; possible weight loss of 15 lbs within unknown timeframe         Intervention     Goal:   General: Nutrition support treatment  PO: Initiated " feeding as medically appropriate    Intervention: Follow treatment progress, Care plan reviewed, Interview for preferences, Await begin PO, Supplement offered/refused      Monitoring/Evaluation:       Monitoring/Evaluation: Per protocol  Will Continue to follow per protocol  Luma Cobb RD  Time Spent: 30 min

## 2018-04-11 NOTE — ANESTHESIA PREPROCEDURE EVALUATION
Anesthesia Evaluation                  Airway   Mallampati: II  Dental      Pulmonary    Cardiovascular     (+) hypertension, past MI ,       Neuro/Psych  GI/Hepatic/Renal/Endo      Musculoskeletal     Abdominal    Substance History      OB/GYN          Other                        Anesthesia Plan    ASA 3     general and regional     intravenous induction   Anesthetic plan and risks discussed with patient.

## 2018-04-12 LAB
ABO + RH BLD: NORMAL
ANION GAP SERPL CALCULATED.3IONS-SCNC: 8 MMOL/L (ref 3–11)
BASOPHILS # BLD AUTO: 0.02 10*3/MM3 (ref 0–0.2)
BASOPHILS NFR BLD AUTO: 0.2 % (ref 0–1)
BH BB BLOOD EXPIRATION DATE: NORMAL
BH BB BLOOD TYPE BARCODE: 6200
BH BB DISPENSE STATUS: NORMAL
BH BB PRODUCT CODE: NORMAL
BH BB UNIT NUMBER: NORMAL
BILIRUB UR QL STRIP: NEGATIVE
BUN BLD-MCNC: 12 MG/DL (ref 9–23)
BUN/CREAT SERPL: 20 (ref 7–25)
CALCIUM SPEC-SCNC: 7.1 MG/DL (ref 8.7–10.4)
CHLORIDE SERPL-SCNC: 111 MMOL/L (ref 99–109)
CLARITY UR: CLEAR
CO2 SERPL-SCNC: 22 MMOL/L (ref 20–31)
COLOR UR: YELLOW
CREAT BLD-MCNC: 0.6 MG/DL (ref 0.6–1.3)
DEPRECATED RDW RBC AUTO: 50 FL (ref 37–54)
EOSINOPHIL # BLD AUTO: 0 10*3/MM3 (ref 0–0.3)
EOSINOPHIL NFR BLD AUTO: 0 % (ref 0–3)
ERYTHROCYTE [DISTWIDTH] IN BLOOD BY AUTOMATED COUNT: 14.5 % (ref 11.3–14.5)
GFR SERPL CREATININE-BSD FRML MDRD: 94 ML/MIN/1.73
GLUCOSE BLD-MCNC: 131 MG/DL (ref 70–100)
GLUCOSE UR STRIP-MCNC: NEGATIVE MG/DL
HCT VFR BLD AUTO: 22.4 % (ref 34.5–44)
HGB BLD-MCNC: 7.1 G/DL (ref 11.5–15.5)
HGB UR QL STRIP.AUTO: NEGATIVE
IMM GRANULOCYTES # BLD: 0.03 10*3/MM3 (ref 0–0.03)
IMM GRANULOCYTES NFR BLD: 0.3 % (ref 0–0.6)
KETONES UR QL STRIP: NEGATIVE
LEUKOCYTE ESTERASE UR QL STRIP.AUTO: NEGATIVE
LYMPHOCYTES # BLD AUTO: 1.62 10*3/MM3 (ref 0.6–4.8)
LYMPHOCYTES NFR BLD AUTO: 15.5 % (ref 24–44)
MCH RBC QN AUTO: 29.8 PG (ref 27–31)
MCHC RBC AUTO-ENTMCNC: 31.7 G/DL (ref 32–36)
MCV RBC AUTO: 94.1 FL (ref 80–99)
MONOCYTES # BLD AUTO: 1.04 10*3/MM3 (ref 0–1)
MONOCYTES NFR BLD AUTO: 10 % (ref 0–12)
NEUTROPHILS # BLD AUTO: 7.73 10*3/MM3 (ref 1.5–8.3)
NEUTROPHILS NFR BLD AUTO: 74 % (ref 41–71)
NITRITE UR QL STRIP: NEGATIVE
PH UR STRIP.AUTO: <=5 [PH] (ref 5–8)
PLATELET # BLD AUTO: 117 10*3/MM3 (ref 150–450)
PMV BLD AUTO: 10.5 FL (ref 6–12)
POTASSIUM BLD-SCNC: 3.6 MMOL/L (ref 3.5–5.5)
PROT UR QL STRIP: NEGATIVE
RBC # BLD AUTO: 2.38 10*6/MM3 (ref 3.89–5.14)
SODIUM BLD-SCNC: 141 MMOL/L (ref 132–146)
SP GR UR STRIP: 1.02 (ref 1–1.03)
UNIT  ABO: NORMAL
UNIT  RH: NORMAL
UROBILINOGEN UR QL STRIP: NORMAL
WBC NRBC COR # BLD: 10.44 10*3/MM3 (ref 3.5–10.8)

## 2018-04-12 PROCEDURE — 99233 SBSQ HOSP IP/OBS HIGH 50: CPT | Performed by: HOSPITALIST

## 2018-04-12 PROCEDURE — P9016 RBC LEUKOCYTES REDUCED: HCPCS

## 2018-04-12 PROCEDURE — 85025 COMPLETE CBC W/AUTO DIFF WBC: CPT | Performed by: HOSPITALIST

## 2018-04-12 PROCEDURE — 97166 OT EVAL MOD COMPLEX 45 MIN: CPT | Performed by: OCCUPATIONAL THERAPIST

## 2018-04-12 PROCEDURE — 97162 PT EVAL MOD COMPLEX 30 MIN: CPT

## 2018-04-12 PROCEDURE — 80048 BASIC METABOLIC PNL TOTAL CA: CPT | Performed by: HOSPITALIST

## 2018-04-12 PROCEDURE — 36430 TRANSFUSION BLD/BLD COMPNT: CPT

## 2018-04-12 PROCEDURE — 25010000003 CEFAZOLIN IN DEXTROSE 2-4 GM/100ML-% SOLUTION: Performed by: ORTHOPAEDIC SURGERY

## 2018-04-12 PROCEDURE — 97530 THERAPEUTIC ACTIVITIES: CPT | Performed by: OCCUPATIONAL THERAPIST

## 2018-04-12 PROCEDURE — 99024 POSTOP FOLLOW-UP VISIT: CPT | Performed by: ORTHOPAEDIC SURGERY

## 2018-04-12 PROCEDURE — 25010000002 ROPIVACAINE PER 1 MG: Performed by: NURSE ANESTHETIST, CERTIFIED REGISTERED

## 2018-04-12 PROCEDURE — 81003 URINALYSIS AUTO W/O SCOPE: CPT | Performed by: NURSE PRACTITIONER

## 2018-04-12 PROCEDURE — 86900 BLOOD TYPING SEROLOGIC ABO: CPT

## 2018-04-12 RX ORDER — ACETAMINOPHEN 325 MG/1
650 TABLET ORAL EVERY 8 HOURS
Status: DISCONTINUED | OUTPATIENT
Start: 2018-04-12 | End: 2018-04-15 | Stop reason: HOSPADM

## 2018-04-12 RX ADMIN — CEFAZOLIN SODIUM 2 G: 2 INJECTION, SOLUTION INTRAVENOUS at 03:24

## 2018-04-12 RX ADMIN — MULTIPLE VITAMINS W/ MINERALS TAB 1 TABLET: TAB ORAL at 09:11

## 2018-04-12 RX ADMIN — DOCUSATE SODIUM 100 MG: 100 CAPSULE, LIQUID FILLED ORAL at 21:15

## 2018-04-12 RX ADMIN — CEFAZOLIN SODIUM 2 G: 2 INJECTION, SOLUTION INTRAVENOUS at 10:36

## 2018-04-12 RX ADMIN — SODIUM CHLORIDE 500 ML: 9 INJECTION, SOLUTION INTRAVENOUS at 02:05

## 2018-04-12 RX ADMIN — SODIUM CHLORIDE 500 ML: 9 INJECTION, SOLUTION INTRAVENOUS at 00:27

## 2018-04-12 RX ADMIN — DOCUSATE SODIUM 100 MG: 100 CAPSULE, LIQUID FILLED ORAL at 09:14

## 2018-04-12 RX ADMIN — ACETAMINOPHEN 650 MG: 325 TABLET ORAL at 18:05

## 2018-04-12 RX ADMIN — ATORVASTATIN CALCIUM 20 MG: 40 TABLET, FILM COATED ORAL at 21:16

## 2018-04-12 RX ADMIN — SODIUM CHLORIDE 500 ML: 9 INJECTION, SOLUTION INTRAVENOUS at 09:11

## 2018-04-12 RX ADMIN — ACETAMINOPHEN 650 MG: 325 TABLET ORAL at 09:14

## 2018-04-12 RX ADMIN — POLYETHYLENE GLYCOL (3350) 17 G: 17 POWDER, FOR SOLUTION ORAL at 09:11

## 2018-04-12 RX ADMIN — MAGNESIUM HYDROXIDE 10 ML: 2400 SUSPENSION ORAL at 21:16

## 2018-04-12 RX ADMIN — ROPIVACAINE HYDROCHLORIDE 8 ML/HR: 2 INJECTION, SOLUTION EPIDURAL; INFILTRATION at 18:06

## 2018-04-12 NOTE — PROGRESS NOTES
[facility]    Acute pain service Inpatient Progress Note    Patient Name: Leydi Agosto  :  1928  MRN:  [id number]        Acute Pain  Service Inpatient Progress Note:    Analgesia:Good  Pain Score:0/10  LOC: alert and awake  Side Effects:None  Catheter Site:clean, dry and dressing intact  Cath type: peripheral nerve cath(MOOG pump)  Infusion rate: 8ml/hr  Catheter Plan:Catheter to remain Insitu and Continue catheter infusion rate unchanged

## 2018-04-12 NOTE — PROGRESS NOTES
"Orthopaedic Surgery Hip Fracture Post-Op Progress Note      LOS: 2 days   Patient Care Team:  Perez Pedraza MD as PCP - General (Internal Medicine)    POD 1    Subjective     Interval History:   Patient underwent long trochanteric nail for reverse obliquity intertrochanteric femur fracture yesterday evening.  Patient had a 500 mL blood loss at the time of surgery.  This morning, she appears comfortable.  She denies weakness, chest pain, shortness of breath.    Objective     Vital Signs:  Temp (24hrs), Av °F (37.2 °C), Min:98 °F (36.7 °C), Max:99.9 °F (37.7 °C)    /65   Pulse 84   Temp 99.1 °F (37.3 °C) (Temporal Artery )   Resp 16   Ht 160 cm (63\")   Wt 56.7 kg (125 lb 1.6 oz)   SpO2 99%   BMI 22.16 kg/m²     Labs:  Lab Results (last 24 hours)     Procedure Component Value Units Date/Time    Basic Metabolic Panel [[id number]] Updated:  18    Specimen:  Blood     CBC & Differential [[id number]] Collected:  18    Specimen:  Blood Updated:  18    Narrative:       The following orders were created for panel order CBC & Differential.  Procedure                               Abnormality         Status                     ---------                               -----------         ------                     CBC Auto Differential[[id number]]        Abnormal            Final result                 Please view results for these tests on the individual orders.    CBC Auto Differential [[id number]]  (Abnormal) Collected:  18    Specimen:  Blood Updated:  18     WBC 10.44 10*3/mm3      RBC 2.38 (L) 10*6/mm3      Hemoglobin 7.1 (L) g/dL      Hematocrit 22.4 (L) %      MCV 94.1 fL      MCH 29.8 pg      MCHC 31.7 (L) g/dL      RDW 14.5 %      RDW-SD 50.0 fl      MPV 10.5 fL      Platelets 117 (L) 10*3/mm3      Neutrophil % 74.0 (H) %      Lymphocyte % 15.5 (L) %      Monocyte % 10.0 %      Eosinophil % 0.0 %      Basophil % 0.2 %      Immature Grans % 0.3 %     "  Neutrophils, Absolute 7.73 10*3/mm3      Lymphocytes, Absolute 1.62 10*3/mm3      Monocytes, Absolute 1.04 (H) 10*3/mm3      Eosinophils, Absolute 0.00 10*3/mm3      Basophils, Absolute 0.02 10*3/mm3      Immature Grans, Absolute 0.03 10*3/mm3           Physical Exam:  EHL, FHL, gastroc soleus, and tibialis anterior are intact  Toes are pink and warm  Surgical dressing is in place  Calf is soft and non tender  No pain with gentle ROM of the hip    Assessment/Plan     Postoperative day number 1 status post trochanteric nail for reverse obliquity intertrochanteric femur fracture .    Labs: Hematocrit is 22 this morning.  She did receive 1 unit of blood in the operating room.  Platelets are 117,000.    Pain Control: Adequate    PT and OT     Weight Bearing Status: Patient may weight-bear as tolerated for transfers on the right lower extremity.  Touchdown weightbearing on the right lower extremity at all other times.  Patient is a household ambulator at her baseline.    DVT prophylaxis: Hold Lovenox today.  Initiate Lovenox tomorrow.  Follow platelet count.  Hold Plavix.    Discharge planning: Patient resides at assisted living at [facility].  Subacute rehabilitation there would be ideal.    Upon discharge, patient will need follow-up with me in the PA clinic in 2 weeks' time.  Continue DVTpx for 4 weeks.  Continue Aquacel dressings until follow-up.  They may need to be changed tomorrow if saturated.  Call with questions or concerns.    Elie Walsh MD  04/12/18  8:19 AM

## 2018-04-12 NOTE — THERAPY EVALUATION
Acute Care - Physical Therapy Initial Evaluation   [place]     Patient Name: Leydi Agosto  : 1928  MRN: [id number]  Today's Date: 2018   Onset of Illness/Injury or Date of Surgery: 18  Date of Referral to PT: 18  Referring Physician: MD Calli      Admit Date: 4/10/2018    Visit Dx:     ICD-10-CM ICD-9-CM   1. Fall, initial encounter W19.XXXA E888.9   2. Closed fracture of right hip, initial encounter S72.001A 820.8   3. History of coronary artery disease Z86.79 V12.59   4. Essential hypertension I10 401.9   5. Closed displaced intertrochanteric fracture of right femur, initial encounter S72.141A 820.21   6. Impaired functional mobility, balance, gait, and endurance Z74.09 V49.89     Patient Active Problem List   Diagnosis   • Hip fracture   • Closed displaced intertrochanteric fracture of right femur     Past Medical History:   Diagnosis Date   • Coronary artery disease    • Hypertension    • MI, old          Past Surgical History:   Procedure Laterality Date   • CORONARY ANGIOPLASTY WITH STENT PLACEMENT      stents x 5   • HIP TROCANTERIC NAILING WITH INTRAMEDULLARY HIP SCREW Right 2018    Procedure: HIP TROCANTERIC NAILING WITH INTRAMEDULLARY HIP SCREW;  Surgeon: Elie Walsh MD;  Location: [facility];  Service: Orthopedics   • JOINT REPLACEMENT          PT ASSESSMENT (last 12 hours)      Physical Therapy Evaluation     Row Name 18 1107          PT Evaluation Time/Intention    Subjective Information no complaints  -EH     Document Type evaluation  -EH     Mode of Treatment individual therapy;physical therapy  -EH     Patient Effort good  -EH     Symptoms Noted During/After Treatment other (see comments)  -EH     Comment Pt initially answers orientation question appropriately and decribes accident. Later pt asks what happened to her.  -EH     Row Name 18 110          General Information    Patient Profile Reviewed? yes  -EH     Onset of Illness/Injury  or Date of Surgery 04/11/18  -     Referring Physician MD Calli  -     Patient Observations alert;cooperative;agree to therapy  -     General Observations of Patient t supine in bed wtih IV, nerve cath intact   -     Prior Level of Function independent:;all household mobility;community mobility;min assist:;dressing   reports independence with bathing; being taught on sock aid   -     Equipment Currently Used at Home walker, rolling  -     Pertinent History of Current Functional Problem Pt with R intertrochanteric femure fx now SP long troch nail.   -     Existing Precautions/Restrictions hip;weight bearing;other (see comments)   TDWB RLE with hip repair, nerve catheter on R.  -     Limitations/Impairments safety/cognitive  -     Risks Reviewed patient:;dizziness;LOB;change in vital signs;increased discomfort  -     Benefits Reviewed patient:;improve function;increase independence;increase strength;decrease pain  -     Barriers to Rehab medically complex;previous functional deficit;cognitive status  -     Row Name 04/12/18 1107          Relationship/Environment    Lives With facility resident  -     Family Caregiver if Needed child(nory), adult  -     Row Name 04/12/18 1107          Resource/Environmental Concerns    Current Living Arrangements independent/assisted living [facility]  -     Resource/Environmental Concerns none  -     Row Name 04/12/18 1107          Cognitive Assessment/Intervention- PT/OT    Orientation Status (Cognition) oriented x 4;other (see comments)   then confusion other on situation place time after t/f.  -     Follows Commands (Cognition) follows one step commands;50-74% accuracy  -     Row Name 04/12/18 1107          Mobility Assessment/Treatment    Extremity Weight-bearing Status right lower extremity  -     Right Lower Extremity (Weight-bearing Status) touch down weight-bearing (TDWB)  -     Row Name 04/12/18 1107          Bed Mobility  Assessment/Treatment    Bed Mobility Assessment/Treatment supine-sit;sit-supine  -     Supine-Sit [place] (Bed Mobility) minimum assist (75% patient effort)  -     Sit-Supine [place] (Bed Mobility) minimum assist (75% patient effort)  -EH     Row Name 04/12/18 1107          Transfer Assessment/Treatment    Transfer Assessment/Treatment stand pivot/stand step transfer  -     Sit-Stand [place] (Transfers) maximum assist (25% patient effort);2 person assist  -     Stand-Sit [place] (Transfers) maximum assist (25% patient effort);2 person assist  -EH     Row Name 04/12/18 1107          Sit-Stand Transfer    Assistive Device (Sit-Stand Transfers) walker, front-wheeled  -EH     Row Name 04/12/18 1107          Stand-Sit Transfer    Assistive Device (Stand-Sit Transfers) walker, front-wheeled  -EH     Row Name 04/12/18 1107          Stand Pivot/Stand Step Transfer    Stand Pivot/Stand Step [place] dependent (less than 25% patient effort);2 person assist  -EH     Row Name 04/12/18 1107          Gait/Stairs Assessment/Training    Comment (Gait/Stairs) not appropriate.  -EH     Row Name 04/12/18 1107          General ROM    GENERAL ROM COMMENTS LLE WNL, RLE knee extension limited.   -EH     Row Name 04/12/18 1107          General Assessment (Manual Muscle Testing)    Comment, General Manual Muscle Testing (MMT) Assessment LLE 3+/5; RLE 2/5   -EH     Row Name 04/12/18 1107          Motor Assessment/Intervention    Additional Documentation Balance (Group)  -EH     Row Name 04/12/18 1107          Balance    Balance static sitting balance;static standing balance  -EH     Row Name 04/12/18 1107          Static Sitting Balance    Level of [place] (Unsupported Sitting, Static Balance) maximal assist, 25 to 49% patient effort   progressses to [facility]     Sitting Position (Unsupported Sitting, Static Balance) sitting on edge of bed  -EH     Row Name 04/12/18 1107          Static Standing Balance     Level of [place] (Supported Standing, Static Balance) dependent  -     Time Able to Maintain Position (Supported Standing, Static Balance) less than 15 seconds  -     Comment (Unsupported Standing, Static Balance) pt's BLEs bend, requires dep aSSIST  to chair  -[facility] Name 04/12/18 1107          Sensory Assessment/Intervention    Sensory General Assessment --  -EH     Row Name 04/12/18 1107          Pain Assessment    Additional Documentation Pain Scale 2: FACES Pre/Post-Treatment (Group)  -EH     Row Name 04/12/18 1107          Pain Scale 2: Numbers Pre/Post-Treatment    Pain Location 2 - Side Left  -     Pain Location 2 hip  -[facility] 04/12/18 1107          Pain Scale 2: FACES Pre/Post-Treatment    Pain 2: FACES Scale, Pretreatment 2-->hurts little bit  -EH     Pain 2: FACES Scale, Post-Treatment 2-->hurts little bit  -     Row Name             Wound 04/11/18 1922 Right leg incision    Wound - Properties Group Date first assessed: 04/11/18  -JV Time first assessed: 1922  -JV Side: Right  -JV Location: leg  -JV Type: incision  -JV    [facility] Name 04/12/18 1107          Physical Therapy Clinical Impression    Date of Referral to PT 04/11/18  -     PT Diagnosis (PT Clinical Impression) decreased independence, decreased mobility. decreased strength with s/p IM nailing after hip fx.  -     Criteria for Skilled Interventions Met (PT Clinical Impression) yes;treatment indicated  -     Rehab Potential (PT Clinical Summary) fair, will monitor progress closely  -EH     Row Name 04/12/18 1107          Vital Signs    Pre Systolic BP Rehab --   stable in supine prior to t/f and in  chair after t/f.  -[facility] 04/12/18 1107          Physical Therapy Goals    Bed Mobility Goal Selection (PT) bed mobility, PT goal 1  -     Transfer Goal Selection (PT) transfer, PT goal 1  -     Gait Training Goal Selection (PT) gait training, PT goal 1  -EH     Row Name 04/12/18 1107          Bed Mobility  Goal 1 (PT)    Activity/Assistive Device (Bed Mobility Goal 1, PT) bed mobility activities, all;sit to supine;supine to sit  -     [place] Level/Cues Needed (Bed Mobility Goal 1, PT) minimum assist (75% or more patient effort)  -     Time Frame (Bed Mobility Goal 1, PT) 2 weeks;long term goal (LTG)  -     Row Name 04/12/18 1107          Transfer Goal 1 (PT)    Activity/Assistive Device (Transfer Goal 1, PT) sit-to-stand/stand-to-sit  -     [place] Level/Cues Needed (Transfer Goal 1, PT) moderate assist (50-74% patient effort)  -     Time Frame (Transfer Goal 1, PT) long term goal (LTG);2 weeks  -     Row Name 04/12/18 1107          Gait Training Goal 1 (PT)    Activity/Assistive Device (Gait Training Goal 1, PT) gait (walking locomotion);walker, rolling  -     [place] Level (Gait Training Goal 1, PT) moderate assist (50-74% patient effort)  -     Distance (Gait Goal 1, PT) 10  -     Time Frame (Gait Training Goal 1, PT) 2 weeks;long term goal (LTG)  -     Row Name 04/12/18 1107          Positioning and Restraints    Pre-Treatment Position in bed  -     Post Treatment Position chair  -EH     In Chair notified nsg;reclined;call light within reach;encouraged to call for assist;exit alarm on;on mechanical lift sling  -     Row Name 04/12/18 1107          Living Environment    Home Accessibility wheelchair accessible  -       User Key  (r) = Recorded By, (t) = Taken By, (c) = Cosigned By    Initials Name Provider Type     Sasha Kelley, PT Physical Therapist    JONAS Babin, RN Registered Nurse          Physical Therapy Education     Title: PT OT SLP Therapies (Done)     Topic: Physical Therapy (Done)     Point: Mobility training (Done)    Learning Progress Summary     Learner Status Readiness Method Response Comment Documented by    Patient Done Acceptance E [facility] 04/12/18 2211          Point: Home exercise program (Done)    Learning Progress Summary     Learner  Status Readiness Method Response Comment Documented by    Patient Done Acceptance E [facility] 04/12/18 1346          Point: Body mechanics (Done)    Learning Progress Summary     Learner Status Readiness Method Response Comment Documented by    Patient Done Acceptance E [facility] 04/12/18 1346          Point: Precautions (Done)    Learning Progress Summary     Learner Status Readiness Method Response Comment Documented by    Patient Done Acceptance E [facility] 04/12/18 1346                      User Key     Initials Effective Dates Name Provider Type Discipline     06/19/15 -  Sasha Kelley, PT Physical Therapist PT                PT Recommendation and Plan  Anticipated Discharge Disposition (PT): skilled nursing facility (SNF)  Planned Therapy Interventions (PT Eval): balance training, bed mobility training, gait training, home exercise program, strengthening, ROM (range of motion), transfer training  Therapy Frequency (PT Clinical Impression): daily  Outcome Summary/Treatment Plan (PT)  Anticipated Discharge Disposition (PT): skilled nursing facility (SNF)  Plan of Care Reviewed With: patient  Outcome Summary: PT educated pt on WB status. Pt assisted to chair during with LANETTE UE support and cues for WB. Pt has increased confusion  after t/f and unable to perform ther ex well. Pt will need SNF level rehab at d/c.          Outcome Measures     Row Name 04/12/18 1107             How much help from another person do you currently need...    Turning from your back to your side while in flat bed without using bedrails? 2  -EH      Moving from lying on back to sitting on the side of a flat bed without bedrails? 2  -EH      Moving to and from a bed to a chair (including a wheelchair)? 1  -EH      Standing up from a chair using your arms (e.g., wheelchair, bedside chair)? 2  -EH      Climbing 3-5 steps with a railing? 1  -EH      To walk in hospital room? 1  -EH      AM-PAC 6 Clicks Score 9  -EH         Functional Assessment     Outcome Measure Options AM-PAC 6 Clicks Basic Mobility (PT)  -        User Key  (r) = Recorded By, (t) = Taken By, (c) = Cosigned By    Initials Name Provider Type     Sasha Kelley PT Physical Therapist           Time Calculation:         PT Charges     Row Name 04/12/18 1351             Time Calculation    Start Time 1107  -EH      PT Received On 04/12/18  -      PT Goal Re-Cert Due Date 04/22/18  -         Time Calculation- PT    Total Timed Code Minutes- PT 0 minute(s)  -        User Key  (r) = Recorded By, (t) = Taken By, (c) = Cosigned By    Initials Name Provider Type     Sasha Kelley PT Physical Therapist          Therapy Charges for Today     Code Description Service Date Service Provider Modifiers Qty    93957382591 HC PT EVAL MOD COMPLEXITY 4 4/12/2018 Sasha Kelley, PT GP 1    67367624710 HC PT THER SUPP EA 15 MIN 4/12/2018 Sasha Kelley, PT GP 2          PT G-Codes  Outcome Measure Options: AM-PAC 6 Clicks Basic Mobility (PT)      Sasha Kelley, PT  4/12/2018

## 2018-04-12 NOTE — PLAN OF CARE
Problem: Patient Care Overview  Goal: Plan of Care Review  Outcome: Ongoing (interventions implemented as appropriate)   04/12/18 0194   Coping/Psychosocial   Plan of Care Reviewed With patient   OTHER   Outcome Summary Pt alert, oriented to self and intermittent confusion. Pt unable to recall reason for hospitalization. Pt required dependence for transfer, LB ALDS and bed mobility. KI utilized for transfer training d/t quad weakness. Drainage from distal aquacel dressing post transfer, draining outside dressing site. RN aware and assessed. Recommend SNF-level rehab.

## 2018-04-12 NOTE — SIGNIFICANT NOTE
RN states back from OR tonight. Hypotensive with best SBP in 80s. Otherwise pt not symptomatic. Fluids at 50 ml/hr. Stat 500 ml bolus. Appears we know very little of her health hx and chart scarce. Not overloaded on exam, CXR WNL, no BNP checked. No UOP since on floor, RN to bladder scan and I&O cath if no [place], if reduced will increase fluids. Added hold parameters ot atenolol for AM.    Please f/u.        --- gave another 500 ml as best BP post bolus SBP~100. >400 urine on scan and I&O cath.

## 2018-04-12 NOTE — PLAN OF CARE
Problem: Skin Injury Risk (Adult)  Goal: Identify Related Risk Factors and Signs and Symptoms  Outcome: Ongoing (interventions implemented as appropriate)
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
## 2018-04-13 PROBLEM — R33.9 URINARY RETENTION: Status: ACTIVE | Noted: 2018-04-13

## 2018-04-13 LAB
ABO + RH BLD: NORMAL
ABO + RH BLD: NORMAL
ANION GAP SERPL CALCULATED.3IONS-SCNC: 8 MMOL/L (ref 3–11)
BASOPHILS # BLD AUTO: 0.03 10*3/MM3 (ref 0–0.2)
BASOPHILS NFR BLD AUTO: 0.3 % (ref 0–1)
BH BB BLOOD EXPIRATION DATE: NORMAL
BH BB BLOOD EXPIRATION DATE: NORMAL
BH BB BLOOD TYPE BARCODE: 6200
BH BB BLOOD TYPE BARCODE: 6200
BH BB DISPENSE STATUS: NORMAL
BH BB DISPENSE STATUS: NORMAL
BH BB PRODUCT CODE: NORMAL
BH BB PRODUCT CODE: NORMAL
BH BB UNIT NUMBER: NORMAL
BH BB UNIT NUMBER: NORMAL
BUN BLD-MCNC: 11 MG/DL (ref 9–23)
BUN/CREAT SERPL: 22 (ref 7–25)
CALCIUM SPEC-SCNC: 7.6 MG/DL (ref 8.7–10.4)
CHLORIDE SERPL-SCNC: 111 MMOL/L (ref 99–109)
CO2 SERPL-SCNC: 20 MMOL/L (ref 20–31)
CREAT BLD-MCNC: 0.5 MG/DL (ref 0.6–1.3)
DEPRECATED RDW RBC AUTO: 61.3 FL (ref 37–54)
EOSINOPHIL # BLD AUTO: 0.06 10*3/MM3 (ref 0–0.3)
EOSINOPHIL NFR BLD AUTO: 0.6 % (ref 0–3)
ERYTHROCYTE [DISTWIDTH] IN BLOOD BY AUTOMATED COUNT: 19.1 % (ref 11.3–14.5)
GFR SERPL CREATININE-BSD FRML MDRD: 116 ML/MIN/1.73
GLUCOSE BLD-MCNC: 128 MG/DL (ref 70–100)
HCT VFR BLD AUTO: 28.9 % (ref 34.5–44)
HGB BLD-MCNC: 9.3 G/DL (ref 11.5–15.5)
IMM GRANULOCYTES # BLD: 0.05 10*3/MM3 (ref 0–0.03)
IMM GRANULOCYTES NFR BLD: 0.5 % (ref 0–0.6)
LYMPHOCYTES # BLD AUTO: 1.29 10*3/MM3 (ref 0.6–4.8)
LYMPHOCYTES NFR BLD AUTO: 12.4 % (ref 24–44)
MCH RBC QN AUTO: 28.5 PG (ref 27–31)
MCHC RBC AUTO-ENTMCNC: 32.2 G/DL (ref 32–36)
MCV RBC AUTO: 88.7 FL (ref 80–99)
MONOCYTES # BLD AUTO: 1.06 10*3/MM3 (ref 0–1)
MONOCYTES NFR BLD AUTO: 10.2 % (ref 0–12)
NEUTROPHILS # BLD AUTO: 7.93 10*3/MM3 (ref 1.5–8.3)
NEUTROPHILS NFR BLD AUTO: 76 % (ref 41–71)
PLATELET # BLD AUTO: 93 10*3/MM3 (ref 150–450)
PMV BLD AUTO: 10.7 FL (ref 6–12)
POTASSIUM BLD-SCNC: 3.6 MMOL/L (ref 3.5–5.5)
RBC # BLD AUTO: 3.26 10*6/MM3 (ref 3.89–5.14)
SODIUM BLD-SCNC: 139 MMOL/L (ref 132–146)
UNIT  ABO: NORMAL
UNIT  ABO: NORMAL
UNIT  RH: NORMAL
UNIT  RH: NORMAL
WBC NRBC COR # BLD: 10.42 10*3/MM3 (ref 3.5–10.8)

## 2018-04-13 PROCEDURE — 97116 GAIT TRAINING THERAPY: CPT

## 2018-04-13 PROCEDURE — 99024 POSTOP FOLLOW-UP VISIT: CPT | Performed by: ORTHOPAEDIC SURGERY

## 2018-04-13 PROCEDURE — 80048 BASIC METABOLIC PNL TOTAL CA: CPT | Performed by: HOSPITALIST

## 2018-04-13 PROCEDURE — 25810000003 SODIUM CHLORIDE 0.9 % WITH KCL 20 MEQ 20-0.9 MEQ/L-% SOLUTION: Performed by: ORTHOPAEDIC SURGERY

## 2018-04-13 PROCEDURE — 85025 COMPLETE CBC W/AUTO DIFF WBC: CPT | Performed by: HOSPITALIST

## 2018-04-13 PROCEDURE — 25010000002 FUROSEMIDE PER 20 MG: Performed by: HOSPITALIST

## 2018-04-13 PROCEDURE — 25010000002 ENOXAPARIN PER 10 MG: Performed by: HOSPITALIST

## 2018-04-13 PROCEDURE — 99233 SBSQ HOSP IP/OBS HIGH 50: CPT | Performed by: HOSPITALIST

## 2018-04-13 PROCEDURE — 97110 THERAPEUTIC EXERCISES: CPT

## 2018-04-13 PROCEDURE — 25010000002 ROPIVACAINE PER 1 MG: Performed by: NURSE ANESTHETIST, CERTIFIED REGISTERED

## 2018-04-13 RX ORDER — FUROSEMIDE 10 MG/ML
20 INJECTION INTRAMUSCULAR; INTRAVENOUS ONCE
Status: COMPLETED | OUTPATIENT
Start: 2018-04-13 | End: 2018-04-13

## 2018-04-13 RX ADMIN — POLYETHYLENE GLYCOL (3350) 17 G: 17 POWDER, FOR SOLUTION ORAL at 10:53

## 2018-04-13 RX ADMIN — ATENOLOL 50 MG: 50 TABLET ORAL at 10:52

## 2018-04-13 RX ADMIN — POTASSIUM CHLORIDE AND SODIUM CHLORIDE 50 ML/HR: 900; 150 INJECTION, SOLUTION INTRAVENOUS at 05:40

## 2018-04-13 RX ADMIN — FUROSEMIDE 20 MG: 10 INJECTION, SOLUTION INTRAMUSCULAR; INTRAVENOUS at 14:59

## 2018-04-13 RX ADMIN — DOCUSATE SODIUM 100 MG: 100 CAPSULE, LIQUID FILLED ORAL at 20:36

## 2018-04-13 RX ADMIN — ROPIVACAINE HYDROCHLORIDE 8 ML/HR: 2 INJECTION, SOLUTION EPIDURAL; INFILTRATION at 17:32

## 2018-04-13 RX ADMIN — ACETAMINOPHEN 650 MG: 325 TABLET ORAL at 10:52

## 2018-04-13 RX ADMIN — MAGNESIUM HYDROXIDE 10 ML: 2400 SUSPENSION ORAL at 19:04

## 2018-04-13 RX ADMIN — ACETAMINOPHEN 650 MG: 325 TABLET ORAL at 17:32

## 2018-04-13 RX ADMIN — POTASSIUM CHLORIDE AND SODIUM CHLORIDE 50 ML/HR: 900; 150 INJECTION, SOLUTION INTRAVENOUS at 17:32

## 2018-04-13 RX ADMIN — MULTIPLE VITAMINS W/ MINERALS TAB 1 TABLET: TAB ORAL at 10:53

## 2018-04-13 RX ADMIN — ENOXAPARIN SODIUM 40 MG: 40 INJECTION SUBCUTANEOUS at 15:00

## 2018-04-13 RX ADMIN — DOCUSATE SODIUM 100 MG: 100 CAPSULE, LIQUID FILLED ORAL at 10:52

## 2018-04-13 RX ADMIN — ATORVASTATIN CALCIUM 20 MG: 40 TABLET, FILM COATED ORAL at 20:36

## 2018-04-13 NOTE — THERAPY TREATMENT NOTE
"Acute Care - Physical Therapy Progress Note  [facility]     Patient Name: Leydi Agosto  : 1928  MRN: [id number]  Today's Date: 2018  Onset of Illness/Injury or Date of Surgery: 04/10/18  Date of Referral to PT: 18  Referring Physician: Dr. Walsh    Admit Date: 4/10/2018    Visit Dx:    ICD-10-CM ICD-9-CM   1. Fall, initial encounter W19.XXXA E888.9   2. Closed fracture of right hip, initial encounter S72.001A 820.8   3. History of coronary artery disease Z86.79 V12.59   4. Essential hypertension I10 401.9   5. Closed displaced intertrochanteric fracture of right femur, initial encounter S72.141A 820.21   6. Impaired functional mobility, balance, gait, and endurance Z74.09 V49.89   7. Impaired mobility and ADLs Z74.09 799.89     Patient Active Problem List   Diagnosis   • Hip fracture   • Closed displaced intertrochanteric fracture of right femur   • Urinary retention       Therapy Treatment          Rehabilitation Treatment Summary     Row Name 18 1352             Treatment Time/Intention    Discipline physical therapist  -EH      Document Type therapy note (daily note)  -EH      Subjective Information no complaints  -EH      Mode of Treatment individual therapy;physical therapy  -      Existing Precautions/Restrictions hip;right;weight bearing;brace worn when out of bed   WBAT  RLE for T/F, TDWB for all other; Nerve cath KI  -EH      Recorded by [EH] Sasha Kelley, PT 18 1552 18 1552      Row Name 18 1352             Cognitive Assessment/Intervention- PT/OT    Affect/Mental Status (Cognitive) flat/blunted affect;confused  -EH      Orientation Status (Cognition) oriented to;person;situation   April, \"[facility]\"  -EH      Follows Commands (Cognition) follows one step commands;25-49% accuracy  -      Cognitive Function (Cognitive) memory deficit;safety deficit  -EH      Memory Deficit (Cognitive) moderate deficit  -EH      Safety Deficit (Cognitive) " judgment;problem solving  -      Personal Safety Interventions fall prevention program maintained;gait belt;nonskid shoes/slippers when out of bed  -      Recorded by [] Sasha Kelley, PT 04/13/18 1552 04/13/18 1552      Row Name 04/13/18 1352             Bed Mobility Assessment/Treatment    Rolling Left [place] (Bed Mobility) dependent (less than 25% patient effort)  -      Rolling Right [place] (Bed Mobility) dependent (less than 25% patient effort)  -EH      Recorded by [] Sasha Kelley, PT 04/13/18 1552 04/13/18 1552      Row Name 04/13/18 1352             Transfer Assessment/Treatment    Transfer Assessment/Treatment stand-sit transfer;sit-stand transfer;chair-bed transfer  -      Chair-Bed [place] (Transfers) dependent (less than 25% patient effort)  -      Sit-Stand [place] (Transfers) dependent (less than 25% patient effort);2 person assist   progressed with assist, initially fighting VCs for safety  -      Stand-Sit [place] (Transfers) dependent (less than 25% patient effort);2 person assist  -EH      Recorded by [] Sasha Kelley, PT 04/13/18 1552 04/13/18 1552      Row Name 04/13/18 1352             Chair-Bed Transfer    Assistive Device (Chair-Bed Transfers) mechanical lift/aid  -      Recorded by [] Sasha Kelley, PT 04/13/18 1552 04/13/18 1552      Row Name 04/13/18 1352             Gait/Stairs Assessment/Training    Comment (Gait/Stairs) not approptiate  -EH      Recorded by [] Sasha Kelley, PT 04/13/18 1552 04/13/18 1552      Row Name 04/13/18 1352             Motor Skills Assessment/Interventions    Additional Documentation Therapeutic Exercise (Group);Therapeutic Exercise Interventions (Group)  -      Recorded by [] Sasha Kelley, PT 04/13/18 1552 04/13/18 1552      Row Name 04/13/18 1352             Therapeutic Exercise    Lower Extremity (Therapeutic Exercise) gluteal sets;quad sets, bilateral  -      Lower  Extremity Range of Motion (Therapeutic Exercise) ankle dorsiflexion/plantar flexion, bilateral  -EH      Sets/Reps (Therapeutic Exercise) 1/10  -EH      Recorded by [EH] Sasha Kelley, PT 04/13/18 1552 04/13/18 1552      Row Name 04/13/18 1352             Positioning and Restraints    Pre-Treatment Position sitting in chair/recliner  -EH      Post Treatment Position bed  -EH      In Bed supine;call light within reach;encouraged to call for assist;exit alarm on;notified nsg  -EH      Recorded by [EH] Sasha Kelley, PT 04/13/18 1552 04/13/18 1552      Row Name 04/13/18 1352             Pain Scale 2: Numbers Pre/Post-Treatment    Pain Location 2 - Side Left  -EH      Pain Location 2 hip  -EH      Recorded by [] Sasha Kelley, PT 04/13/18 1552 04/13/18 1552      Row Name 04/13/18 1352             Pain Scale 2: FACES Pre/Post-Treatment    Pain 2: FACES Scale, Pretreatment 2-->hurts little bit  -EH      Pain 2: FACES Scale, Post-Treatment 2-->hurts little bit  -EH      Recorded by [EH] Sasha Kelley, PT 04/13/18 1552 04/13/18 1552      Row Name                Wound 04/11/18 1922 Right leg incision    Wound - Properties Group Date first assessed: 04/11/18 [JV] Time first assessed: 1922 [JV] Side: Right [JV] Location: leg [JV] Type: incision [JV] Recorded by:  [JV] Ernst Babin RN 04/11/18 1922 04/11/18 1922    Row Name 04/13/18 1352             Outcome Summary/Treatment Plan (PT)    Daily Summary of Progress (PT) progress toward functional goals is gradual  -EH      Recorded by [EH] Sasha Kelley, PT 04/13/18 1552 04/13/18 1552        User Key  (r) = Recorded By, (t) = Taken By, (c) = Cosigned By    Initials Name Effective Dates Discipline     Sasha Kelley, PT 06/19/15 -  PT    JV Ernst Babin RN 06/16/16 -  Nurse          Wound 04/11/18 1922 Right leg incision (Active)   Dressing Appearance intact;dry 4/13/2018 10:30 AM   Closure [place] 4/13/2018  6:10 AM   Base dressing in place,  unable to visualize 4/13/2018 10:30 AM   Periwound edematous;ecchymotic 4/13/2018 10:30 AM   Drainage Amount none 4/13/2018 10:30 AM   Dressing Care, Wound dressing changed 4/13/2018  6:10 AM             Physical Therapy Education     Title: PT OT SLP Therapies (Active)     Topic: Physical Therapy (Done)     Point: Mobility training (Done)    Learning Progress Summary     Learner Status Readiness Method Response Comment Documented by    Patient Done Acceptance E [facility] 04/12/18 1346          Point: Home exercise program (Done)    Learning Progress Summary     Learner Status Readiness Method Response Comment Documented by    Patient Done Acceptance E [facility] 04/12/18 1346          Point: Body mechanics (Done)    Learning Progress Summary     Learner Status Readiness Method Response Comment Documented by    Patient Done Acceptance E [facility] 04/12/18 1346          Point: Precautions (Done)    Learning Progress Summary     Learner Status Readiness Method Response Comment Documented by    Patient Done Acceptance E [facility] 04/12/18 1346                      User Key     Initials Effective Dates Name Provider Type Discipline     06/19/15 -  Sasha Kelley, PT Physical Therapist PT                    PT Recommendation and Plan  Anticipated Discharge Disposition (PT): skilled nursing facility (SNF)  Planned Therapy Interventions (PT Eval): balance training, bed mobility training, gait training, home exercise program, strengthening, ROM (range of motion), transfer training  Therapy Frequency (PT Clinical Impression): daily  Outcome Summary/Treatment Plan (PT)  Daily Summary of Progress (PT): progress toward functional goals is gradual  Anticipated Discharge Disposition (PT): skilled nursing facility (SNF)  Plan of Care Reviewed With: patient  Outcome Summary: Pt continues to require total assist during t/f with BUE support foreign rajan. Contiues with mild confusion.           Outcome Measures     Row Name 04/13/18 6840  04/12/18 1315 04/12/18 1107       How much help from another person do you currently need...    Turning from your back to your side while in flat bed without using bedrails? 2  -EH  -- 2  -EH    Moving from lying on back to sitting on the side of a flat bed without bedrails? 2  -EH  -- 2  -EH    Moving to and from a bed to a chair (including a wheelchair)? 1  -EH  -- 1  -EH    Standing up from a chair using your arms (e.g., wheelchair, bedside chair)? 2  -EH  -- 2  -EH    Climbing 3-5 steps with a railing? 1  -EH  -- 1  -EH    To walk in hospital room? 1  -EH  -- 1  -EH    AM-PAC 6 Clicks Score 9  -EH  -- 9  -EH       How much help from another is currently needed...    Putting on and taking off regular lower body clothing?  -- 1  -AR  --    Bathing (including washing, rinsing, and drying)  -- 2  -AR  --    Toileting (which includes using toilet bed pan or urinal)  -- 1  -AR  --    Putting on and taking off regular upper body clothing  -- 2  -AR  --    Taking care of personal grooming (such as brushing teeth)  -- 3  -AR  --    Eating meals  -- 3  -AR  --    Score  -- 12  -AR  --       Functional Assessment    Outcome Measure Options AM-[facility] 6 Clicks Basic Mobility (PT)  - AM-[facility] 6 Clicks Daily Activity (OT)  -[facility] 6 Clicks Basic Mobility (PT)  -      User Key  (r) = Recorded By, (t) = Taken By, (c) = Cosigned By    Initials Name Provider Type     Sasha Kelley, PT Physical Therapist    AR Kathleen Swan, OT Occupational Therapist           Time Calculation:         PT Charges     Row Name 04/13/18 1554             Time Calculation    Start Time 1352  -      PT Received On 04/13/18  -      PT Goal Re-Cert Due Date 04/22/18  -         Time Calculation- PT    Total Timed Code Minutes- PT 23 minute(s)  -        User Key  (r) = Recorded By, (t) = Taken By, (c) = Cosigned By    Initials Name Provider Type     Sasha Kelley, PT Physical Therapist          Therapy Charges for Today     Code  Description Service Date Service Provider Modifiers Qty    22239899720 HC PT EVAL MOD COMPLEXITY 4 4/12/2018 Sasha Kelley, PT GP 1    11029877461 HC PT THER SUPP EA 15 MIN 4/12/2018 Sasha Kelley, PT GP 2    04599007817 HC GAIT TRAINING EA 15 MIN 4/13/2018 Sasha Kelley, PT GP 1    34366112106 HC PT THER PROC EA 15 MIN 4/13/2018 Sasha Kelley, PT GP 1    74461200179 HC PT THER SUPP EA 15 MIN 4/13/2018 Sasha Kelley, PT GP 2          PT G-Codes  Outcome Measure Options: AM-PAC 6 Clicks Basic Mobility (PT)    Sasha Kelley, PT  4/13/2018

## 2018-04-13 NOTE — PROGRESS NOTES
[facility] Medicine Services  PROGRESS NOTE    Patient Name: Leydi Agosto  : 1928  MRN: [id number]    Date of Admission: 4/10/2018  Length of Stay: 2  Primary Care Physician: Perez Pedraza MD    Subjective   Subjective     CC:  F/u hip fracture    HPI:  Doing well, pain adequately controlled. Denies any CP or SOA. No dizziness. No paresthesia or other focal neurologic deficits. Afebrile.    Review of Systems  Otherwise ROS is negative except as mentioned in the HPI.    Objective   Objective     Vital Signs:   Temp:  [97.9 °F (36.6 °C)-99.1 °F (37.3 °C)] 98.8 °F (37.1 °C)  Heart Rate:  [] 80  Resp:  [12-16] 14  BP: ()/(46-65) 117/51        Physical Exam:  General Assessment: No acute cardiopulmonary distress.     CVS: RRR, S1S2 normal, no murmurs    Resp: CTAB, no adventitious sound    Abd: soft, NT, ND, normal BS, no guarding or peritoneal signs    Ext: No edema, both calves are symmetric and NTTP, right hip dressing stained with dry blood.    Neuro: No facial asymmetry, speech clear    Skin: W/D/I. No rash.    Psych: Affect is appropriate      Results Reviewed:  I have personally reviewed current lab, radiology, and data and agree.      Results from last 7 days  Lab Units 18  0555 18  0650 1849 [birth year]18  1928   WBC 10*3/mm3 10.44 11.44*  --  7.62   HEMOGLOBIN g/dL 7.1* 9.4*  --  11.6   HEMATOCRIT % 22.4* 29.7*  --  36.3   PLATELETS 10*3/mm3 117* 186  --  208   INR   --   --  1.10*  --        Results from last 7 days  Lab Units 18  0824 18  0649 04[birth year]/18  1928   SODIUM mmol/L 141 141 140   POTASSIUM mmol/L 3.6 3.6 3.7   CHLORIDE mmol/L 111* 111* 108   CO2 mmol/L 22.0 25.0 24.0   BUN mg/dL 12 16 13   CREATININE mg/dL 0.60 0.70 0.90   GLUCOSE mg/dL 131* 130* 196*   CALCIUM mg/dL 7.1* 8.0* 8.2*   ALT (SGPT) U/L  --  23 22   AST (SGOT) U/L  --  21 27     Estimated Creatinine Clearance: 42.7 mL/min (by C-G formula based on SCr of 0.6  mg/dL).  No results found for: BNP  No results found for: PHART    Microbiology Results Abnormal     None          Imaging Results (last 24 hours)     Procedure Component Value Units Date/Time    FL C Arm During Surgery [[id number]] Collected:  04/12/18 0945     Updated:  04/12/18 1140    Narrative:       EXAMINATION: FLUOROSCOPY C-ARM DURING SURGERY-04/11/2018:     INDICATION: Trochanteric nail ; W19.XXXA-Unspecified fall, initial  encounter; S72.001A-Fracture of unspecified part of neck of right femur,  initial encounter for closed fracture; Z86.79-Personal history of other  diseases of the circulatory system; I10-Essential (primary)  hypertension; S72.141A-Displaced intertrochanteric fracture of right  femur, initial encounter for closed fracture.      TECHNIQUE: Intraoperative fluoroscopy for improved localization and  treatment planning.     COMPARISON: Radiographs earlier same day.     FINDINGS: Intraoperative fluoroscopy with total fluoroscopic time usage  3 minutes and 11 seconds and 11 representative images saved during right  hip ORIF and trochanteric nailing spanning the right subtrochanteric  femoral fracture.       Impression:       Intraoperative fluoroscopy utilized during right hip ORIF.     D:  04/12/2018  E:  04/12/2018            This report was finalized on 4/12/2018 11:38 AM by Dr. Tuan Quiroga.       XR Femur 2 View Right [[id number]] Collected:  04/11/18 1117     Updated:  04/12/18 1005    Narrative:       EXAMINATION: XR FEMUR 2 VW, RIGHT-04/11/2018:      INDICATION: Evaluate femur/total knee implant; W19.XXXA-Unspecified  fall, initial encounter; S72.001A-Fracture of unspecified part of neck  of right femur, initial encounter for closed fracture; Z86.79-Personal  history of other diseases of the circulatory system; I10-Essential  (primary) hypertension; S72.141A-Displaced intertrochanteric fracture of  right femur, initial encounter for closed fracture.      COMPARISON: NONE.     FINDINGS: Two  views of the right femur reveal hardware seen in the  distal femur from total knee arthroplasty. The tibial hardware has been  removed. There is fracture seen of the proximal femoral shaft in the  subtrochanteric portion. No significant change in the alignment. Soft  tissue swelling identified. Vascular calcification seen within the soft  tissues.           Impression:       Subtrochanteric fracture seen of the right femur. There is  hardware seen in the distal femur from knee arthroplasty.     D:  04/11/2018  E:  04/11/2018     This report was finalized on 4/12/2018 10:03 AM by Dr. Shanika Durant MD.                I have reviewed the medications.    Assessment/Plan   Assessment / Plan     Hospital Problem List     Hip fracture    Closed displaced intertrochanteric fracture of right femur             Brief Hospital Course to date:  Leydi Agosto is a 89 y.o. female with history of HTN and CAD/remote MI, who is admitted with right hip fracture after a mechanical fall.        Assessment & Plan:  - Mechanical fall with resulting righ hip fracture, s/p right hip surgery on 4/11 by Dr Mccurdy   - ABL anemia, asymptomatic, discuss transfusion with pt, she is reluctant at this time. She does have history of CAD with stent, so will transfuse 2 units today.  - CAD stable/asymptomatic  - Activity level from ortho appreciated  - Ongoing PT/OT while here, CM to assist with DC planning. She will benefit from short term rehab, preferably back at [facility] [place] if possible.  - repeat CBC/BMP in am       DVT Prophylaxis:  Mechanical only for now, anticipate surgery. Chem DVT ppx after surgery per ortho.      CODE STATUS: Full Code     Disposition: Likely back to [facility] with short term rehab.    Electronically signed by Jane Iraheta MD, 04/12/18, 10:09 PM.

## 2018-04-13 NOTE — PROGRESS NOTES
Continued Stay Note  [facility]     Patient Name: Leydi Agosto  MRN: [id number]  Today's Date: 4/13/2018    Admit Date: 4/10/2018          Discharge Plan     Row Name 04/13/18 1426       Plan    Plan STR    Patient/Family in Agreement with Plan yes    Plan Comments Spoke with patient at bedside. Per therapy recs she would benefit from short term rehab. She is agreeable and would like to go to [facility], where she is an assisted living resident. Referral called to [facility] and they are able to accept patient when medically ready. EMS has been tentatively scheduled for tomorrow, 4/14, at 1300. MD notified. Nuse to call report to [phone]. DC summary to be faxed to [phone].  following.     Final Discharge Disposition Code 03 - skilled nursing facility (SNF)              Discharge Codes    No documentation.       Expected Discharge Date and Time     Expected Discharge Date Expected Discharge Time    Apr 16, 2018             Lily Henderson RN

## 2018-04-13 NOTE — PROGRESS NOTES
[facility]    Acute pain service Inpatient Progress Note    Patient Name: Leydi Agosto  :  1928  MRN:  [id number]        Acute Pain  Service Inpatient Progress Note:    Analgesia:Good  LOC: asleep but arousable  Resp Status: room air  Cardiac: VS stable  Side Effects:None  Catheter Site:clean, dressing intact and dry  Cath type: peripheral nerve cath(MOOG pump)  Infusion rate: 8ml/hr  Catheter Plan:Catheter to remain Insitu and Continue catheter infusion rate unchanged  Comments: Plan to d/c am tomorrow.  Thank you

## 2018-04-13 NOTE — PLAN OF CARE
Problem: Patient Care Overview  Goal: Plan of Care Review  Outcome: Ongoing (interventions implemented as appropriate)   04/13/18 9323   Coping/Psychosocial   Plan of Care Reviewed With patient   OTHER   Outcome Summary Pt continues to require total assist during t/f with BUE support foreign rajan. Contiues with mild confusion.

## 2018-04-13 NOTE — PROGRESS NOTES
"Orthopaedic Surgery Hip Fracture Post-Op Progress Note      LOS: 3 days   Patient Care Team:  Perez Pedraza MD as PCP - General (Internal Medicine)    POD 2    Subjective     Interval History:   Pt mildly confused per PT notes.  Denies pain.    Objective     Vital Signs:  Temp (24hrs), Av.6 °F (37 °C), Min:97.9 °F (36.6 °C), Max:99.3 °F (37.4 °C)    /61 (BP Location: Right arm, Patient Position: Lying)   Pulse 80   Temp 99.3 °F (37.4 °C) (Oral)   Resp 14   Ht 160 cm (63\")   Wt 56.7 kg (125 lb 1.6 oz)   SpO2 91%   BMI 22.16 kg/m²     Labs:  Lab Results (last 24 hours)     Procedure Component Value Units Date/Time    Urinalysis With / Microscopic If Indicated - Urine, Clean Catch [[id number]]  (Normal) Collected:  18 2348    Specimen:  Urine from Urine, Clean Catch Updated:  18 2352     Color, UA Yellow     Appearance, UA Clear     pH, UA <=5.0     Specific Gravity, UA 1.024     Glucose, UA Negative     Ketones, UA Negative     Bilirubin, UA Negative     Blood, UA Negative     Protein, UA Negative     Leuk Esterase, UA Negative     Nitrite, UA Negative     Urobilinogen, UA 0.2 E.U./dL    Narrative:       Urine microscopic not indicated.    Basic Metabolic Panel [[id number]]  (Abnormal) Collected:  18 0824    Specimen:  Blood Updated:  18 0933     Glucose 131 (H) mg/dL      BUN 12 mg/dL      Creatinine 0.60 mg/dL      Sodium 141 mmol/L      Potassium 3.6 mmol/L      Chloride 111 (H) mmol/L      CO2 22.0 mmol/L      Calcium 7.1 (L) mg/dL      eGFR Non African Amer 94 mL/min/1.73      BUN/Creatinine Ratio 20.0     Anion Gap 8.0 mmol/L     Narrative:       [employer] Guidelines    Stage     Description        GFR  1         Normal or High     90+  2         Mild decrease      60-89  3         Moderate decrease  30-59  4         Severe decrease    15-29  5         Kidney failure     <15    CBC & Differential [[id number]] Collected:  18 0555    Specimen:  Blood " Updated:  04/12/18 0654    Narrative:       The following orders were created for panel order CBC & Differential.  Procedure                               Abnormality         Status                     ---------                               -----------         ------                     CBC Auto Differential[[id number]]        Abnormal            Final result                 Please view results for these tests on the individual orders.    CBC Auto Differential [[id number]]  (Abnormal) Collected:  04/12/18 0555    Specimen:  Blood Updated:  04/12/18 0654     WBC 10.44 10*3/mm3      RBC 2.38 (L) 10*6/mm3      Hemoglobin 7.1 (L) g/dL      Hematocrit 22.4 (L) %      MCV 94.1 fL      MCH 29.8 pg      MCHC 31.7 (L) g/dL      RDW 14.5 %      RDW-SD 50.0 fl      MPV 10.5 fL      Platelets 117 (L) 10*3/mm3      Neutrophil % 74.0 (H) %      Lymphocyte % 15.5 (L) %      Monocyte % 10.0 %      Eosinophil % 0.0 %      Basophil % 0.2 %      Immature Grans % 0.3 %      Neutrophils, Absolute 7.73 10*3/mm3      Lymphocytes, Absolute 1.62 10*3/mm3      Monocytes, Absolute 1.04 (H) 10*3/mm3      Eosinophils, Absolute 0.00 10*3/mm3      Basophils, Absolute 0.02 10*3/mm3      Immature Grans, Absolute 0.03 10*3/mm3           Physical Exam:  EHL, FHL, gastroc soleus, and tibialis anterior are intact  Toes are pink and warm  Surgical dressing is in place  Calf is soft and non tender  No pain with gentle ROM of the hip    Assessment/Plan   Postoperative day number 2 status post trochanteric nail for reverse obliquity intertrochanteric femur fracture .     Labs: Pending this am.  Received 2U PRBC yesterday.     Pain Control: Adequate     PT and OT     Dressings will be changed this am.     Weight Bearing Status: Patient may weight-bear as tolerated for transfers on the right lower extremity.  Touchdown weightbearing on the right lower extremity at all other times.  Patient is a household ambulator at her baseline.     DVT prophylaxis: Begin  low dose Lovenox this am.  Follow platelet count.     Discharge planning: SNF     Upon discharge, patient will need follow-up with me in the PA clinic in 2 weeks' time.  Continue DVTpx for 4 weeks. Continue Aquacel dressings until follow-up.     Call with questions or concerns.     Elie Walsh MD  04/13/18  6:41 AM

## 2018-04-13 NOTE — PROGRESS NOTES
"Clinical Nutrition   Reason For Visit: Follow-up protocol    Patient Name: Leydi Agosto  YOB: 1928  MRN: [id number]  Date of Encounter: 04/13/18 11:13 AM  Admission date: 4/10/2018      Nutrition Assessment     Hospital Problem List  Active Problems:    Hip fracture    Closed displaced intertrochanteric fracture of right femur      PMH: She  has a past medical history of Coronary artery disease; Hypertension; and MI, old.   PSxH: She  has a past surgical history that includes Joint replacement; Coronary angioplasty with stent; and Hip Trochanteric Nailing (Right, 4/11/2018).       Reported/Observed/Food/Nutrition Related History:     Patient reports fair appetite. Observed ate almost 50% of breakfast this AM. CNA brought juice patient requested. Wants cameron on breakfast trays.      Anthropometrics   Height: 160 cm (63\")  Weight: 56.7 kg (125 lb 1.6 oz)  BMI: 22.16 kg/m²  BMI classification: Normal: 18.5-24.9kg/m2            Nutrition Focused Physical Exam Findings: n/a    Labs reviewed   Labs reviewed: Yes      Results from last 7 days  Lab Units 04/13/18  0628 04/12/18  0824 04/11/18  0649   SODIUM mmol/L 139 141 141   POTASSIUM mmol/L 3.6 3.6 3.6   CHLORIDE mmol/L 111* 111* 111*   CO2 mmol/L 20.0 22.0 25.0   BUN mg/dL 11 12 16   CREATININE mg/dL 0.50* 0.60 0.70   GLUCOSE mg/dL 128* 131* 130*   CALCIUM mg/dL 7.6* 7.1* 8.0*           Medications reviewed   Medications reviewed: Yes      Intake/Ouptut 24 hrs (7:00AM - 6:59 AM)     Intake & Output (last day)       04/12 0701 - 04/13 0700 04/13 0701 - 04/14 0700    I.V. (mL/kg) 1072 (18.9)     Blood 720     IV Piggyback      Total Intake(mL/kg) 1792 (31.6)     Urine (mL/kg/hr) 725 (0.5)     Blood      Total Output 725      Net +1067                  Current Nutrition Prescription   PO: Diet Regular    Evaluation of Received Nutrient/Fluid Intake:  PO Intake: 0%  # of meals: 2  Days PO Evaluated: from (4/11)    Nutrition Diagnosis     Problem Inadequate " oral intake   Etiology Clinical condition   Signs/Symptoms PO Intake (4/11)       Intervention     Goal:   General: Nutrition support treatment  PO: Increase intake    Intervention: Follow treatment progress, Care plan reviewed, Advise alternate selection, Interview for preferences, Encourage intake      Monitoring/Evaluation:       Monitoring/Evaluation: Per protocol, PO intake  Will Continue to follow per protocol  Eileen Moore  Time Spent: 20 minutes

## 2018-04-13 NOTE — PROGRESS NOTES
"    [facility] Medicine Services  PROGRESS NOTE    Patient Name: Leydi Agosto  : 1928  MRN: [id number]    Date of Admission: 4/10/2018  Length of Stay: 3  Primary Care Physician: Perez Pedraza MD    Subjective   Subjective     CC:  F/u fall with right hip fracture    HPI:  Unable to void yesterday/overnight, retained 300-400cc, had been doing in and out cath. Pain is well controlled. Got blood yesterday and reported that she feels better. Denies any chest pain or SOA. PT/RN reported that pt is very weak and had to be \"lifted\" into a chair this morning.    Review of Systems  Otherwise ROS is negative except as mentioned in the HPI.    Objective   Objective     Vital Signs:   Temp:  [97.9 °F (36.6 °C)-99.3 °F (37.4 °C)] 98.1 °F (36.7 °C)  Heart Rate:  [] 82  Resp:  [14-17] 17  BP: ()/(51-61) 135/61        Physical Exam:  General Assessment: No acute cardiopulmonary distress.      CVS: RRR, S1S2 normal, no murmurs     Resp: Good air flow bilat, + basilar crackles, no wheezing, resp non-labored     Abd: soft, NT, ND, normal BS, no guarding or peritoneal signs     Ext: 2+ pitting edema bilat    Neuro: No facial asymmetry, speech clear     Skin: W/D/I. No rash.     Psych: Affect is appropriate       Results Reviewed:  I have personally reviewed current lab, radiology, and data and agree.      Results from last 7 days  Lab Units 18  0618  0555 18  0650 18  0649   WBC 10*3/mm3 10.42 10.44 11.44*  --    HEMOGLOBIN g/dL 9.3* 7.1* 9.4*  --    HEMATOCRIT % 28.9* 22.4* 29.7*  --    PLATELETS 10*3/mm3 93* 117* 186  --    INR   --   --   --  1.10*       Results from last 7 days  Lab Units 18  0628 18  0824 18  0649 04/10/18  1928   SODIUM mmol/L 139 141 141 140   POTASSIUM mmol/L 3.6 3.6 3.6 3.7   CHLORIDE mmol/L 111* 111* 111* 108   CO2 mmol/L 20.0 22.0 25.0 24.0   BUN mg/dL 11 12 16 13   CREATININE mg/dL 0.50* 0.60 0.70 0.90   GLUCOSE " mg/dL 128* 131* 130* 196*   CALCIUM mg/dL 7.6* 7.1* 8.0* 8.2*   ALT (SGPT) U/L  --   --  23 22   AST (SGOT) U/L  --   --  21 27     Estimated Creatinine Clearance: 42.7 mL/min (by C-G formula based on SCr of 0.5 mg/dL (L)).  No results found for: BNP  No results found for: PHART    Microbiology Results Abnormal     None          Imaging Results (last 24 hours)     Procedure Component Value Units Date/Time    FL C Arm During Surgery [[id number]] Collected:  04/12/18 0945     Updated:  04/12/18 1140    Narrative:       EXAMINATION: FLUOROSCOPY C-ARM DURING SURGERY-04/11/2018:     INDICATION: Trochanteric nail ; W19.XXXA-Unspecified fall, initial  encounter; S72.001A-Fracture of unspecified part of neck of right femur,  initial encounter for closed fracture; Z86.79-Personal history of other  diseases of the circulatory system; I10-Essential (primary)  hypertension; S72.141A-Displaced intertrochanteric fracture of right  femur, initial encounter for closed fracture.      TECHNIQUE: Intraoperative fluoroscopy for improved localization and  treatment planning.     COMPARISON: Radiographs earlier same day.     FINDINGS: Intraoperative fluoroscopy with total fluoroscopic time usage  3 minutes and 11 seconds and 11 representative images saved during right  hip ORIF and trochanteric nailing spanning the right subtrochanteric  femoral fracture.       Impression:       Intraoperative fluoroscopy utilized during right hip ORIF.     D:  04/12/2018  E:  04/12/2018            This report was finalized on 4/12/2018 11:38 AM by Dr. Tuan Quiroga.                I have reviewed the medications.    Assessment/Plan   Assessment / Plan     Hospital Problem List     Hip fracture    Closed displaced intertrochanteric fracture of right femur    Urinary retention             Brief Hospital Course to date:  Leydi Agosto is a 89 y.o. female with history of HTN and CAD/remote MI, who is admitted with right hip fracture after a mechanical  fall.        Assessment & Plan:  - Mechanical fall with resulting righ hip fracture, s/p right hip surgery on 4/11 by Dr Mccurdy   - ABL anemia, asymptomatic, but given history of CAD with stent, pt was transfused 2 units with appropriate response. Pt did not get diuresed after, so this morning appears to be slightly fluid overload. Will diurese with gently with Lasix 20mg x 1. Strict I/O.  - CAD stable/asymptomatic  - Activity level from ortho appreciated, very weak, will probably need rehab  - Ongoing PT/OT while here, CM to assist with DC planning.   - CBC to monitor Hb and platelet.        DVT Prophylaxis:  Lovenox started this morning after ok'ed by ortho, will need to cont x 4 wks per ortho.     CODE STATUS: Full Code     Disposition: Likely back to [facility] with short term rehab.      Electronically signed by Jane Iraheta MD, 04/13/18, 11:23 AM.

## 2018-04-14 LAB
BASOPHILS # BLD AUTO: 0.02 10*3/MM3 (ref 0–0.2)
BASOPHILS NFR BLD AUTO: 0.2 % (ref 0–1)
DEPRECATED RDW RBC AUTO: 56 FL (ref 37–54)
EOSINOPHIL # BLD AUTO: 0.16 10*3/MM3 (ref 0–0.3)
EOSINOPHIL NFR BLD AUTO: 1.7 % (ref 0–3)
ERYTHROCYTE [DISTWIDTH] IN BLOOD BY AUTOMATED COUNT: 18 % (ref 11.3–14.5)
HCT VFR BLD AUTO: 25 % (ref 34.5–44)
HGB BLD-MCNC: 8.4 G/DL (ref 11.5–15.5)
IMM GRANULOCYTES # BLD: 0.03 10*3/MM3 (ref 0–0.03)
IMM GRANULOCYTES NFR BLD: 0.3 % (ref 0–0.6)
LYMPHOCYTES # BLD AUTO: 1.31 10*3/MM3 (ref 0.6–4.8)
LYMPHOCYTES NFR BLD AUTO: 14.1 % (ref 24–44)
MCH RBC QN AUTO: 28.6 PG (ref 27–31)
MCHC RBC AUTO-ENTMCNC: 33.6 G/DL (ref 32–36)
MCV RBC AUTO: 85 FL (ref 80–99)
MONOCYTES # BLD AUTO: 0.81 10*3/MM3 (ref 0–1)
MONOCYTES NFR BLD AUTO: 8.7 % (ref 0–12)
NEUTROPHILS # BLD AUTO: 6.99 10*3/MM3 (ref 1.5–8.3)
NEUTROPHILS NFR BLD AUTO: 75 % (ref 41–71)
PLATELET # BLD AUTO: 123 10*3/MM3 (ref 150–450)
PMV BLD AUTO: 10.8 FL (ref 6–12)
RBC # BLD AUTO: 2.94 10*6/MM3 (ref 3.89–5.14)
WBC NRBC COR # BLD: 9.32 10*3/MM3 (ref 3.5–10.8)

## 2018-04-14 PROCEDURE — 99232 SBSQ HOSP IP/OBS MODERATE 35: CPT | Performed by: HOSPITALIST

## 2018-04-14 PROCEDURE — 25010000002 ROPIVACAINE PER 1 MG: Performed by: NURSE ANESTHETIST, CERTIFIED REGISTERED

## 2018-04-14 PROCEDURE — 97110 THERAPEUTIC EXERCISES: CPT

## 2018-04-14 PROCEDURE — 85025 COMPLETE CBC W/AUTO DIFF WBC: CPT | Performed by: HOSPITALIST

## 2018-04-14 PROCEDURE — 97530 THERAPEUTIC ACTIVITIES: CPT

## 2018-04-14 PROCEDURE — 25810000003 SODIUM CHLORIDE 0.9 % WITH KCL 20 MEQ 20-0.9 MEQ/L-% SOLUTION: Performed by: ORTHOPAEDIC SURGERY

## 2018-04-14 PROCEDURE — 99024 POSTOP FOLLOW-UP VISIT: CPT | Performed by: ORTHOPAEDIC SURGERY

## 2018-04-14 RX ORDER — BISACODYL 10 MG
10 SUPPOSITORY, RECTAL RECTAL DAILY PRN
Status: DISCONTINUED | OUTPATIENT
Start: 2018-04-14 | End: 2018-04-14 | Stop reason: SDUPTHER

## 2018-04-14 RX ADMIN — ATENOLOL 50 MG: 50 TABLET ORAL at 08:23

## 2018-04-14 RX ADMIN — ACETAMINOPHEN 650 MG: 325 TABLET ORAL at 08:23

## 2018-04-14 RX ADMIN — ACETAMINOPHEN 650 MG: 325 TABLET ORAL at 17:45

## 2018-04-14 RX ADMIN — ATORVASTATIN CALCIUM 20 MG: 40 TABLET, FILM COATED ORAL at 21:28

## 2018-04-14 RX ADMIN — ACETAMINOPHEN 650 MG: 325 TABLET ORAL at 00:23

## 2018-04-14 RX ADMIN — ROPIVACAINE HYDROCHLORIDE 8 ML/HR: 2 INJECTION, SOLUTION EPIDURAL; INFILTRATION at 22:59

## 2018-04-14 RX ADMIN — BISACODYL 10 MG: 10 SUPPOSITORY RECTAL at 09:49

## 2018-04-14 RX ADMIN — POLYETHYLENE GLYCOL (3350) 17 G: 17 POWDER, FOR SOLUTION ORAL at 08:23

## 2018-04-14 RX ADMIN — MULTIPLE VITAMINS W/ MINERALS TAB 1 TABLET: TAB ORAL at 08:23

## 2018-04-14 RX ADMIN — POTASSIUM CHLORIDE AND SODIUM CHLORIDE 50 ML/HR: 900; 150 INJECTION, SOLUTION INTRAVENOUS at 23:01

## 2018-04-14 RX ADMIN — DOCUSATE SODIUM 100 MG: 100 CAPSULE, LIQUID FILLED ORAL at 08:23

## 2018-04-14 NOTE — PROGRESS NOTES
Continued Stay Note  [facility]     Patient Name: Leydi Agosto  MRN: [id number]  Today's Date: 4/14/2018    Admit Date: 4/10/2018          Discharge Plan     Row Name 04/14/18 1426       Plan    Plan update    Patient/Family in Agreement with Plan other (see comments)    Plan Comments Pt not d/c today. [facility] rescheduled for tomorrow at 1pm. Form in chart. See previous CM note for report and faxing of d/c numbers. CM will contact [facility] today to let them know pt is coming tomorrow instead of today. No other needs.               Discharge Codes    No documentation.       Expected Discharge Date and Time     Expected Discharge Date Expected Discharge Time    Apr 16, 2018             Sophia Marie, RN

## 2018-04-14 NOTE — THERAPY TREATMENT NOTE
Acute Care - Physical Therapy Treatment Note  [facility]     Patient Name: Leydi Agosto  : 1928  MRN: [id number]  Today's Date: 2018  Onset of Illness/Injury or Date of Surgery: 04/10/18  Date of Referral to PT: 18  Referring Physician: Dr. Walsh    Admit Date: 4/10/2018    Visit Dx:    ICD-10-CM ICD-9-CM   1. Fall, initial encounter W19.XXXA E888.9   2. Closed fracture of right hip, initial encounter S72.001A 820.8   3. History of coronary artery disease Z86.79 V12.59   4. Essential hypertension I10 401.9   5. Closed displaced intertrochanteric fracture of right femur, initial encounter S72.141A 820.21   6. Impaired functional mobility, balance, gait, and endurance Z74.09 V49.89   7. Impaired mobility and ADLs Z74.09 799.89     Patient Active Problem List   Diagnosis   • Hip fracture   • Closed displaced intertrochanteric fracture of right femur   • Urinary retention       Therapy Treatment          Rehabilitation Treatment Summary     Row Name 18 1447 18 1141          Treatment Time/Intention    Discipline physical therapist  -SC occupational therapist  -TB     Document Type therapy note (daily note)  -SC therapy note (daily note);discharge evaluation/summary  -TB     Subjective Information complains of;pain  -SC complains of;pain  -TB     Mode of Treatment physical therapy  -SC individual therapy  -TB     Patient/Family Observations  -- No family present  -TB     Care Plan Review risks/benefits reviewed  -SC  --     Therapy Frequency (PT Clinical Impression) daily  -SC  --     Patient Effort good  -SC adequate  -TB     Comment  -- Mild confusion noted  -TB     Existing Precautions/Restrictions fall   wbat foor all transfers, tdwb at all times, N cathere  -SC fall;right;hip, posterior   WBAT R LE for transfers; TDWB all other times; N. cath  -TB     Treatment Considerations/Comments  -- WBAT R LE for transfers; TDWB all other times  -TB     Patient Response to Treatment fearful   -SC  --     Recorded by [SC] Marissa Lilly, PT 04/14/18 1649 04/14/18 1649 [TB] Jewell Phan, OT 04/14/18 1214 04/14/18 1214     Row Name 04/14/18 1141             Vital Signs    Pre Systolic BP Rehab 147   BP stable  -TB      Pre Treatment Diastolic BP 63  -TB      O2 Delivery Post Treatment supplemental O2  -TB      Pre Patient Position Supine  -TB      Intra Patient Position Side Lying  -TB      Post Patient Position Supine  -TB      Recorded by [TB] Jewell Phan, OT 04/14/18 1214 04/14/18 1214      Row Name 04/14/18 1447 04/14/18 1141          Cognitive Assessment/Intervention- PT/OT    Orientation Status (Cognition) oriented to;person;situation;verbal cues/prompts needed for orientation;place;time  -SC oriented to;person;place;situation  -TB     Follows Commands (Cognition) follows one step commands;delayed response/completion;increased processing time needed  -SC follows one step commands;50-74% accuracy  -TB     Cognitive Function (Cognitive)  -- attention deficit;memory deficit;safety deficit  -TB     Attention Deficit (Cognitive)  -- moderate deficit  -TB     Memory Deficit (Cognitive)  -- moderate deficit  -TB     Safety Deficit (Cognitive)  -- moderate deficit;ability to follow commands;insight into deficits/self awareness;safety precautions follow-through/compliance  -TB     Personal Safety Interventions  -- fall prevention program maintained;gait belt;nonskid shoes/slippers when out of bed   R THP  -TB     Recorded by [SC] Marissa Lilly, PT 04/14/18 1649 04/14/18 1649 [TB] Jewell Phan, OT 04/14/18 1214 04/14/18 1214     Row Name 04/14/18 1447             Safety Issues, Functional Mobility    Impairments Affecting Function (Mobility) balance;coordination;pain;strength;postural/trunk control  -SC      Recorded by [SC] Marissa Lilly, PT 04/14/18 1649 04/14/18 1649      Row Name 04/14/18 1447 04/14/18 1141          Mobility Assessment/Intervention    Extremity Weight-bearing  Status left lower extremity  -SC left lower extremity  -TB     Left Lower Extremity (Weight-bearing Status) weight-bearing as tolerated (WBAT)   transfers  -SC  --     Right Lower Extremity (Weight-bearing Status)  -- weight-bearing as tolerated (WBAT);touch down weight-bearing (TDWB)   WBAT=transfers; TDWB=all other times  -TB     Recorded by [SC] Marissa Lilly, PT 04/14/18 1649 04/14/18 1649 [TB] Jewell Phan, OT 04/14/18 1214 04/14/18 1214     Row Name 04/14/18 1447 04/14/18 1141          Bed Mobility Assessment/Treatment    Rolling Left [place] (Bed Mobility) maximum assist (25% patient effort)  -SC maximum assist (25% patient effort);2 person assist  -TB     Rolling Right [place] (Bed Mobility) maximum assist (25% patient effort)  -SC maximum assist (25% patient effort);2 person assist  -TB     Scooting/Bridging [place] (Bed Mobility)  -- dependent (less than 25% patient effort);2 person assist  -TB     Bed Mobility, Safety Issues  -- decreased use of arms for pushing/pulling;decreased use of legs for bridging/pushing  -TB     Assistive Device (Bed Mobility)  -- bed rails;draw sheet  -TB     Comment (Bed Mobility) rolled for cleaning and doning of sling. Required repeted cues  -SC confusion and pain limit participation and progress  -TB     Recorded by [SC] Marissa Lilly, PT 04/14/18 1649 04/14/18 1649 [TB] Jewell Phan, OT 04/14/18 1214 04/14/18 1214     Row Name 04/14/18 1141             Functional Mobility    Functional Mobility- Comment Defer to PT  -TB      Recorded by [TB] Jewell Phan, OT 04/14/18 1214 04/14/18 1214      Row Name 04/14/18 1447 04/14/18 1141          Transfer Assessment/Treatment    Comment (Transfers) Working on sit to stand from edge of chair wtih muiltiple attempts. Cues for pushing off from railing and leaining forward-- L leg slipping  and unable to get to standing   -SC Pt refused EOB, OOB, and/or  fowlers for meal   -TB     Sit-Stand  [place] (Transfers) dependent (less than 25% patient effort)  -SC  --     Stand-Sit [place] (Transfers) dependent (less than 25% patient effort)  -SC  --     Recorded by [SC] Marissa MCCALL Maxx, PT 04/14/18 1649 04/14/18 1649 [TB] Jewell Phan, OT 04/14/18 1214 04/14/18 1214     Row Name 04/14/18 1447             Chair-Bed Transfer    Assistive Device (Chair-Bed Transfers) mechanical lift/aid  -SC      Recorded by [SC] Marissa A Maxx, PT 04/14/18 1649 04/14/18 1649      Row Name 04/14/18 1447             Sit-Stand Transfer    Assistive Device (Sit-Stand Transfers) walker, front-wheeled  -SC      Recorded by [SC] Marissa A Maxx, PT 04/14/18 1649 04/14/18 1649      Row Name 04/14/18 1447             Stand-Sit Transfer    Assistive Device (Stand-Sit Transfers) walker, front-wheeled  -SC      Recorded by [SC] Marissa A Maxx, PT 04/14/18 1649 04/14/18 1649      Row Name 04/14/18 1447             Stand Pivot/Stand Step Transfer    Stand Pivot/Stand Step [place] --   deferred  -SC      Recorded by [SC] Marissa A Maxx, PT 04/14/18 1649 04/14/18 1649      Row Name 04/14/18 1447             Gait/Stairs Assessment/Training    Comment (Gait/Stairs) unable  -SC      Recorded by [SC] Marissa A Maxx, PT 04/14/18 1649 04/14/18 1649      Row Name 04/14/18 1141             ADL Assessment/Intervention    BADL Assessment/Intervention bathing;toileting;feeding  -TB      Recorded by [TB] Jewell Phan, OT 04/14/18 1214 04/14/18 1214      Row Name 04/14/18 1141             Bathing Assessment/Intervention    Bathing [place] Level lower body;dependent (less than 25% patient effort)  -TB      Comment (Bathing) following incontinent episodes x2  -TB      Recorded by [TB] Jewell Phan, OT 04/14/18 1214 04/14/18 1214      Row Name 04/14/18 1141             Self-Feeding Assessment/Training    [place] Level (Feeding) set up  -TB      Self-Feeding Assess/Train, Spillage Amount minimal  -TB       Position (Self-Feeding) supine   low fowlers - refused higher positioning  -TB      Comment (Feeding) 1-2 bites of jello only  -TB      Recorded by [TB] Jewell Phan, OT 04/14/18 1214 04/14/18 1214      Row Name 04/14/18 1141             Toileting Assessment/Training    [place] Level (Toileting) dependent (less than 25% patient effort);two person assist required  -TB      Toileting Position supine  -TB      Comment (Toileting) incontinent x2 episodes  -TB      Recorded by [TB] Jewell Phan, OT 04/14/18 1214 04/14/18 1214      Row Name 04/14/18 1141             BADL Safety/Performance    Impairments, BADL Safety/Performance balance;pain;range of motion;strength  -TB      Recorded by [TB] Jewell Phan, OT 04/14/18 1214 04/14/18 1214      Row Name 04/14/18 1447 04/14/18 1141          Therapeutic Exercise    Upper Extremity Range of Motion (Therapeutic Exercise)  -- shoulder flexion/extension, bilateral;shoulder abduction/adduction, bilateral;shoulder horizontal abduction/adduction, bilateral;shoulder internal/external rotation, bilateral;elbow flexion/extension, bilateral;forearm supination/pronation, bilateral  -TB     Hand (Therapeutic Exercise)  -- hand , bilateral  -TB     Lower Extremity Range of Motion (Therapeutic Exercise) hip flexion/extension, left;hip abduction/adduction, left;knee flexion/extension, left;ankle dorsiflexion/plantar flexion, left  -SC  --     Exercise Type (Therapeutic Exercise) AAROM (active assistive range of motion)  -SC AROM (active range of motion)  -TB     Position (Therapeutic Exercise) seated;supine  -SC supine  -TB     Sets/Reps (Therapeutic Exercise)  -- 1/10   pt refused additional sets  -TB     Recorded by [SC] Marissa Lilly, PT 04/14/18 1649 04/14/18 1649 [TB] Jewell Phan, OT 04/14/18 1214 04/14/18 1214     Row Name 04/14/18 1447             Balance    Balance static sitting balance;dynamic sitting balance  -SC      Recorded by [SC]  Marissa CAL Maxx, PT 04/14/18 1649 04/14/18 1649      Row Name 04/14/18 1447             Static Sitting Balance    Level of [place] (Unsupported Sitting, Static Balance) moderate assist, 50 to 74% patient effort  -SC      Sitting Position (Unsupported Sitting, Static Balance) sitting in chair  -SC      Level of [place] (Supported Sitting, Static Balance) contact guard assist  -SC      Sitting Position (Supported Sitting, Static Balance) sitting in chair  -SC      Recorded by [SC] Marissa Lilly, PT 04/14/18 1649 04/14/18 1649      Row Name 04/14/18 1447             Dynamic Sitting Balance    Level of [place], Reaches Outside Midline (Sitting, Dynamic Balance) moderate assist, 50 to 74% patient effort  -SC      Sitting Position, Reaches Outside Midline (Sitting, Dynamic Balance) sitting in chair  -SC      Comment, Reaches Outside Midline (Sitting, Dynamic Balance) working on trunk rocking forward  on edg of chair   -SC      Comment, Resists Mild Perturbations (Sitting, Dynamic Balance) balance improved at end of session requireing just contact assist  -SC      Recorded by [SC] Marissa Lilly, PT 04/14/18 1649 04/14/18 1649      Row Name 04/14/18 1447 04/14/18 1141          Positioning and Restraints    Pre-Treatment Position in bed  -SC in bed  -TB     Post Treatment Position chair  -SC bed  -TB     In Bed supine;exit alarm on;encouraged to call for assist;call light within reach  -SC notified nsg;supine;call light within reach;encouraged to call for assist;exit alarm on  -TB     Recorded by [SC] Marissa Lilly, PT 04/14/18 1649 04/14/18 1649 [TB] Jewell Phan, OT 04/14/18 1214 04/14/18 1214     Row Name 04/14/18 1447 04/14/18 1141          Pain Assessment    Additional Documentation Pain Scale: FACES Pre/Post-Treatment (Group)  -SC Pain Scale: FACES Pre/Post-Treatment (Group)  -TB     Recorded by [SC] Marissa Lilly, PT 04/14/18 1649 04/14/18 1649 [TB] Jewell Phan, OT 04/14/18 1214  "04/14/18 1214     Row Name 04/14/18 1447             Pain Scale: Numbers Pre/Post-Treatment    Pain Location - Side Right  -SC      Pain Location hip  -SC      Recorded by [SC] Marissa Lilly, PT 04/14/18 1649 04/14/18 1649      Row Name 04/14/18 1447 04/14/18 1141          Pain Scale: FACES Pre/Post-Treatment    Pain: FACES Scale, Pretreatment 2-->hurts little bit  -SC 2-->hurts little bit  -TB     Pain: FACES Scale, Post-Treatment 4-->hurts little more  -SC 4-->hurts little more  -TB     Pre/Post Treatment Pain Comment  -- Increased pain with minimal activity; pt refuses change of position due to \"anticipated pain\"  -TB     Recorded by [SC] Marissa Lilly, PT 04/14/18 1649 04/14/18 1649 [TB] Jewell Phan, OT 04/14/18 1214 04/14/18 1214     Row Name 04/14/18 1141             Pain Scale 2: Numbers Pre/Post-Treatment    Pain Location 2 - Side Right  -TB      Pain Location 2 hip  -TB      Recorded by [TB] Jewell Phan, OT 04/14/18 1214 04/14/18 1214      Row Name                Wound 04/11/18 1922 Right leg incision    Wound - Properties Group Date first assessed: 04/11/18 [JV] Time first assessed: 1922 [JV] Side: Right [JV] Location: leg [JV] Type: incision [JV] Recorded by:  [JV] Ernst Babin RN 04/11/18 1922 04/11/18 1922    Row Name 04/14/18 1447 04/14/18 1141          Coping    Observed Emotional State anxious  -SC anxious  -TB     Verbalized Emotional State fear  -SC acceptance  -TB     Recorded by [SC] Marissa Lilly, PT 04/14/18 1649 04/14/18 1649 [TB] Jewell Phan, OT 04/14/18 1214 04/14/18 1214     Row Name 04/14/18 1447 04/14/18 1141          Plan of Care Review    Plan of Care Reviewed With patient  -SC patient  -TB     Recorded by [SC] Marissa Lilly, PT 04/14/18 1649 04/14/18 1649 [TB] Jewell Phan, OT 04/14/18 1214 04/14/18 1214     Row Name 04/14/18 1141             Outcome Summary/Treatment Plan (OT)    Daily Summary of Progress (OT) unable to show any progress " toward functional goals  -TB      Barriers to Overall Progress (OT) confusion and pain  -TB      Plan for Continued Treatment (OT) Pt set to d/c to SNF today  -TB      Reason for Discharge (OT Discharge Summary) patient discharged from this facility  -TB      Recorded by [TB] Jewell Phan, OT 04/14/18 1214 04/14/18 1214      Row Name 04/14/18 1447             Outcome Summary/Treatment Plan (PT)    Daily Summary of Progress (PT) progress towards functional goals is fair  -SC      Recorded by [SC] Marissa Lilly, PT 04/14/18 1649 04/14/18 1649        User Key  (r) = Recorded By, (t) = Taken By, (c) = Cosigned By    Initials Name Effective Dates Discipline    SC Marissa Lilly, PT 06/19/15 -  PT    TB Jewell Phan, OT 06/22/15 -  OT    JONAS Babin RN 06/16/16 -  Nurse          Wound 04/11/18 1922 Right leg incision (Active)   Dressing Appearance intact;dry 4/14/2018 12:27 PM   Base dressing in place, unable to visualize 4/14/2018  6:33 AM   Periwound edematous;ecchymotic 4/14/2018  6:33 AM   Drainage Amount none 4/14/2018 12:27 PM             Physical Therapy Education     Title: PT OT SLP Therapies (Active)     Topic: Physical Therapy (Active)     Point: Mobility training (Active)    Learning Progress Summary     Learner Status Readiness Method Response Comment Documented by    Patient Active Acceptance E NR reviewed benefits of activity SC 04/14/18 1649     Done Acceptance E VU   04/12/18 1346          Point: Home exercise program (Active)    Learning Progress Summary     Learner Status Readiness Method Response Comment Documented by    Patient Active Acceptance E NR reviewed benefits of activity SC 04/14/18 1649     Done Acceptance E VU  EH 04/12/18 1346          Point: Body mechanics (Active)    Learning Progress Summary     Learner Status Readiness Method Response Comment Documented by    Patient Active Acceptance E NR reviewed benefits of activity SC 04/14/18 1649     Done Acceptance E  [facility] 04/12/18 1346          Point: Precautions (Active)    Learning Progress Summary     Learner Status Readiness Method Response Comment Documented by    Patient Active Acceptance E NR reviewed benefits of activity SC 04/14/18 1649     Done Acceptance E [facility] 04/12/18 1346                      User Key     Initials Effective Dates Name Provider Type Discipline    SC 06/19/15 -  Marissa Lilly, PT Physical Therapist PT     06/19/15 -  Sasha Kelley, PT Physical Therapist PT                    PT Recommendation and Plan  Therapy Frequency (PT Clinical Impression): daily  Outcome Summary/Treatment Plan (PT)  Daily Summary of Progress (PT): progress towards functional goals is fair  Plan of Care Reviewed With: patient  Progress: no change  Outcome Summary: Patient continues to be limited in all mobility. She is not ambulatory at this ing and is fearful of falling.          Outcome Measures     Row Name 04/14/18 1547 04/14/18 1141 04/13/18 1352       How much help from another person do you currently need...    Turning from your back to your side while in flat bed without using bedrails? 2  -SC  -- 2  -EH    Moving from lying on back to sitting on the side of a flat bed without bedrails? 1  -SC  -- 2  -EH    Moving to and from a bed to a chair (including a wheelchair)? 1  -SC  -- 1  -EH    Standing up from a chair using your arms (e.g., wheelchair, bedside chair)? 1  -SC  -- 2  -EH    Climbing 3-5 steps with a railing? 1  -SC  -- 1  -EH    To walk in hospital room? 1  -SC  -- 1  -EH    AM-PAC 6 Clicks Score 7  -SC  -- 9  -EH       How much help from another is currently needed...    Putting on and taking off regular lower body clothing?  -- 1  -TB  --    Bathing (including washing, rinsing, and drying)  -- 2  -TB  --    Toileting (which includes using toilet bed pan or urinal)  -- 1  -TB  --    Putting on and taking off regular upper body clothing  -- 2  -TB  --    Taking care of personal grooming (such as  brushing teeth)  -- 3  -TB  --    Eating meals  -- 3  -TB  --    Score  -- 12  -TB  --       Functional Assessment    Outcome Measure Options AM-PAC 6 Clicks Basic Mobility (PT)  -SC AM-PAC 6 Clicks Daily Activity (OT)  -TB AM-PAC 6 Clicks Basic Mobility (PT)  -EH    Row Name 04/12/18 1315 04/12/18 1107          How much help from another person do you currently need...    Turning from your back to your side while in flat bed without using bedrails?  -- 2  -EH     Moving from lying on back to sitting on the side of a flat bed without bedrails?  -- 2  -EH     Moving to and from a bed to a chair (including a wheelchair)?  -- 1  -EH     Standing up from a chair using your arms (e.g., wheelchair, bedside chair)?  -- 2  -EH     Climbing 3-5 steps with a railing?  -- 1  -EH     To walk in hospital room?  -- 1  -EH     AM-PAC 6 Clicks Score  -- 9  -EH        How much help from another is currently needed...    Putting on and taking off regular lower body clothing? 1  -AR  --     Bathing (including washing, rinsing, and drying) 2  -AR  --     Toileting (which includes using toilet bed pan or urinal) 1  -AR  --     Putting on and taking off regular upper body clothing 2  -AR  --     Taking care of personal grooming (such as brushing teeth) 3  -AR  --     Eating meals 3  -AR  --     Score 12  -AR  --        Functional Assessment    Outcome Measure Options AM-PAC 6 Clicks Daily Activity (OT)  -AR AM-PAC 6 Clicks Basic Mobility (PT)  -       User Key  (r) = Recorded By, (t) = Taken By, (c) = Cosigned By    Initials Name Provider Type    SC Marissa Lilly, PT Physical Therapist    TB Jewell Phan, OT Occupational Therapist     Sasha Kelley, PT Physical Therapist    AR Kathleen Swan, OT Occupational Therapist           Time Calculation:         PT Charges     Row Name 04/14/18 0226             Time Calculation    Start Time 1547  -SC      PT Received On 04/14/18  -SC      PT Goal Re-Cert Due Date 04/22/18   -SC         Time Calculation- PT    Total Timed Code Minutes- PT 25 minute(s)  -SC        User Key  (r) = Recorded By, (t) = Taken By, (c) = Cosigned By    Initials Name Provider Type    SC Marissa Lilly, PT Physical Therapist          Therapy Charges for Today     Code Description Service Date Service Provider Modifiers Qty    [id number]  PT THER PROC EA 15 MIN 4/14/2018 Marissa Lilly, PT GP 2          PT G-Codes  Outcome Measure Options: AM-PAC 6 Clicks Basic Mobility (PT)    Marissa Lilly PT  4/14/2018

## 2018-04-14 NOTE — PLAN OF CARE
Problem: Patient Care Overview  Goal: Plan of Care Review  Outcome: Ongoing (interventions implemented as appropriate)   04/14/18 0706   Coping/Psychosocial   Plan of Care Reviewed With patient   OTHER   Outcome Summary Patient continues to be limited in all mobility. She is not ambulatory at this time and is fearful of falling.   Plan of Care Review   Progress no change

## 2018-04-14 NOTE — PLAN OF CARE
Problem: Patient Care Overview  Goal: Plan of Care Review  Outcome: Unable to achieve outcome(s) by discharge Date Met: 04/14/18 04/14/18 1218   Coping/Psychosocial   Plan of Care Reviewed With patient   OTHER   Outcome Summary Pt presents with ongoing confusion and pain limiting participation and progress. Pt u/a to meet goals this stay. Pt refused EOB/OOB activity. Dependent for LB ADLs. Pt is set to d/c to SNF today for continued rehab. OT to d/c at this time.

## 2018-04-14 NOTE — PROGRESS NOTES
[facility]    Acute pain service Inpatient Progress Note    Patient Name: Leydi Agosto  :  1928  MRN:  [id number]        Acute Pain  Service Inpatient Progress Note:    Analgesia:Good  Pain Score:3/10  LOC: alert and awake  Resp Status: room air  Cardiac: VS stable  Side Effects:None  Catheter Site:clean, dressing intact and dry  Cath type: peripheral nerve cath(MOOG pump)  Infusion rate: 8ml/hr  Catheter Plan:Catheter to remain Insitu and Continue catheter infusion rate unchanged

## 2018-04-14 NOTE — PROGRESS NOTES
[facility] Medicine Services  PROGRESS NOTE    Patient Name: Leydi Agosto  : 1928  MRN: [id number]    Date of Admission: 4/10/2018  Length of Stay: 4  Primary Care Physician: Perez Pedraza MD    Subjective   Subjective     CC:  F/u hip fracture    HPI:  Doing well, still unable to void and need in and out cath; no BM in 4 days. No other complaints.    Review of Systems  Otherwise ROS is negative except as mentioned in the HPI.    Objective   Objective     Vital Signs:   Temp:  [97.9 °F (36.6 °C)-98.9 °F (37.2 °C)] 98.7 °F (37.1 °C)  Heart Rate:  [60-72] 64  Resp:  [18] 18  BP: (123-161)/(58-76) 161/72        Physical Exam:  General Assessment: No acute cardiopulmonary distress.      CVS: RRR, S1S2 normal, no murmurs     Resp: Good air flow bilat, + mild basilar crackles, no wheezing, resp non-labored     Abd: soft, NT, ND, normal BS, no guarding or peritoneal signs     Ext: 1+ pitting edema bilat, large ecchymosis at right hip/thigh     Neuro: No facial asymmetry, speech clear     Skin: W/D/I. No rash.     Psych: Affect is appropriate      Results Reviewed:  I have personally reviewed current lab, radiology, and data and agree.      Results from last 7 days  Lab Units 18  0630 18  0618  0555  18  0649   WBC 10*3/mm3 9.32 10.42 10.44  < >  --    HEMOGLOBIN g/dL 8.4* 9.3* 7.1*  < >  --    HEMATOCRIT % 25.0* 28.9* 22.4*  < >  --    PLATELETS 10*3/mm3 123* 93* 117*  < >  --    INR   --   --   --   --  1.10*   < > = values in this interval not displayed.    Results from last 7 days  Lab Units 18  0628 18  0824 18  0649 04/10/18  1928   SODIUM mmol/L 139 141 141 140   POTASSIUM mmol/L 3.6 3.6 3.6 3.7   CHLORIDE mmol/L 111* 111* 111* 108   CO2 mmol/L 20.0 22.0 25.0 24.0   BUN mg/dL 11 12 16 13   CREATININE mg/dL 0.50* 0.60 0.70 0.90   GLUCOSE mg/dL 128* 131* 130* 196*   CALCIUM mg/dL 7.6* 7.1* 8.0* 8.2*   ALT (SGPT) U/L  --   --  23 22   AST  (SGOT) U/L  --   --  21 27     Estimated Creatinine Clearance: 42.7 mL/min (by C-G formula based on SCr of 0.5 mg/dL (L)).  No results found for: BNP  No results found for: PHART    Microbiology Results Abnormal     None          Imaging Results (last 24 hours)     ** No results found for the last 24 hours. **             I have reviewed the medications.    Assessment/Plan   Assessment / Plan     Hospital Problem List     Hip fracture    Closed displaced intertrochanteric fracture of right femur    Urinary retention             Brief Hospital Course to date:  Leydi Agosto is a 89 y.o. female with history of HTN and CAD/remote MI, who is admitted with right hip fracture after a mechanical fall.        Assessment & Plan:  - Mechanical fall with resulting righ hip fracture, s/p right hip surgery on 4/11 by Dr Mccurdy   - ABL anemia, asymptomatic, but given history of CAD with stent, pt was transfused 2 units with appropriate response. Pt did not get diuresed after, so this morning appears to be slightly fluid overload. Will diurese with gently with Lasix 20mg x 1. Strict I/O.  - CAD stable/asymptomatic  - Activity level from ortho appreciated, very weak, will probably need rehab  - Ongoing PT/OT while here, CM to assist with DC planning.   - Bowel care for constipation, enema ordered PRN today if still no BM after suppository  - Cont in an out cath for urinary retention, etiology unclear, will need OP followup if not resolved  - CBC to monitor Hb and platelet.        DVT Prophylaxis:  Lovenox started this morning after ok'ed by ortho, will need to cont x 4 wks per ortho.     CODE STATUS: Full Code     Disposition: Medically stable, but there was a misunderstanding with nurse who called about urinary retention/constipation issues, so she informed CM that pt was not ready to go yet and ambulance was re-arranged for tomorrow. I have spoke with CM and confirmed that if no new issues arise O/N, pt should be able to go.        Electronically signed by Jane Iraheta MD, 04/14/18, 4:50 PM.

## 2018-04-14 NOTE — THERAPY DISCHARGE NOTE
Acute Care - Occupational Therapy Treatment Note/Discharge  [facility]     Patient Name: Leydi Agosto  : 1928  MRN: [id number]  Today's Date: 2018  Onset of Illness/Injury or Date of Surgery: 04/10/18  Date of Referral to OT: 04/10/18  Referring Physician: Dr. Walsh      Admit Date: 4/10/2018    Visit Dx:     ICD-10-CM ICD-9-CM   1. Fall, initial encounter W19.XXXA E888.9   2. Closed fracture of right hip, initial encounter S72.001A 820.8   3. History of coronary artery disease Z86.79 V12.59   4. Essential hypertension I10 401.9   5. Closed displaced intertrochanteric fracture of right femur, initial encounter S72.141A 820.21   6. Impaired functional mobility, balance, gait, and endurance Z74.09 V49.89   7. Impaired mobility and ADLs Z74.09 799.89     Patient Active Problem List   Diagnosis   • Hip fracture   • Closed displaced intertrochanteric fracture of right femur   • Urinary retention       Therapy Treatment          Rehabilitation Treatment Summary     Row Name 18 1141             Treatment Time/Intention    Discipline occupational therapist  -TB      Document Type therapy note (daily note);discharge evaluation/summary  -TB      Subjective Information complains of;pain  -TB      Mode of Treatment individual therapy  -TB      Patient/Family Observations No family present  -TB      Patient Effort adequate  -TB      Comment Mild confusion noted  -TB      Existing Precautions/Restrictions fall;right;hip, posterior   WBAT R LE for transfers; TDWB all other times; N. cath  -TB      Treatment Considerations/Comments WBAT R LE for transfers; TDWB all other times  -TB      Recorded by [TB] Jewell Phan, OT 18 1214 18 1214      Row Name 18 1141             Vital Signs    Pre Systolic BP Rehab 147   BP stable  -TB      Pre Treatment Diastolic BP 63  -TB      O2 Delivery Post Treatment supplemental O2  -TB      Pre Patient Position Supine  -TB      Intra Patient Position  Side Lying  -TB      Post Patient Position Supine  -TB      Recorded by [TB] Jewell Phan, OT 04/14/18 1214 04/14/18 1214      Row Name 04/14/18 1141             Cognitive Assessment/Intervention- PT/OT    Orientation Status (Cognition) oriented to;person;place;situation  -TB      Follows Commands (Cognition) follows one step commands;50-74% accuracy  -TB      Cognitive Function (Cognitive) attention deficit;memory deficit;safety deficit  -TB      Attention Deficit (Cognitive) moderate deficit  -TB      Memory Deficit (Cognitive) moderate deficit  -TB      Safety Deficit (Cognitive) moderate deficit;ability to follow commands;insight into deficits/self awareness;safety precautions follow-through/compliance  -TB      Personal Safety Interventions fall prevention program maintained;gait belt;nonskid shoes/slippers when out of bed   R THP  -TB      Recorded by [TB] Jewell Phan, OT 04/14/18 1214 04/14/18 1214      Row Name 04/14/18 1141             Mobility Assessment/Intervention    Extremity Weight-bearing Status left lower extremity  -TB      Right Lower Extremity (Weight-bearing Status) weight-bearing as tolerated (WBAT);touch down weight-bearing (TDWB)   WBAT=transfers; TDWB=all other times  -TB      Recorded by [TB] Jewell Phan OT 04/14/18 1214 04/14/18 1214      Row Name 04/14/18 1141             Bed Mobility Assessment/Treatment    Rolling Left [place] (Bed Mobility) maximum assist (25% patient effort);2 person assist  -TB      Rolling Right [place] (Bed Mobility) maximum assist (25% patient effort);2 person assist  -TB      Scooting/Bridging [place] (Bed Mobility) dependent (less than 25% patient effort);2 person assist  -TB      Bed Mobility, Safety Issues decreased use of arms for pushing/pulling;decreased use of legs for bridging/pushing  -TB      Assistive Device (Bed Mobility) bed rails;draw sheet  -TB      Comment (Bed Mobility) confusion and pain limit  participation and progress  -TB      Recorded by [TB] Jewell Phan, OT 04/14/18 1214 04/14/18 1214      Row Name 04/14/18 1141             Functional Mobility    Functional Mobility- Comment Defer to PT  -TB      Recorded by [TB] Jewell Phan, OT 04/14/18 1214 04/14/18 1214      Row Name 04/14/18 1141             Transfer Assessment/Treatment    Comment (Transfers) Pt refused EOB, OOB, and/or  fowlers for meal   -TB      Recorded by [TB] Jewell Phan, OT 04/14/18 1214 04/14/18 1214      Row Name 04/14/18 1141             ADL Assessment/Intervention    BADL Assessment/Intervention bathing;toileting;feeding  -TB      Recorded by [TB] Jewell Phan, OT 04/14/18 1214 04/14/18 1214      Row Name 04/14/18 1141             Bathing Assessment/Intervention    Bathing [place] Level lower body;dependent (less than 25% patient effort)  -TB      Comment (Bathing) following incontinent episodes x2  -TB      Recorded by [TB] Jewell Phan, OT 04/14/18 1214 04/14/18 1214      Row Name 04/14/18 1141             Self-Feeding Assessment/Training    [place] Level (Feeding) set up  -TB      Self-Feeding Assess/Train, Spillage Amount minimal  -TB      Position (Self-Feeding) supine   low fowlers - refused higher positioning  -TB      Comment (Feeding) 1-2 bites of jello only  -TB      Recorded by [TB] Jewell Phan, OT 04/14/18 1214 04/14/18 1214      Row Name 04/14/18 1141             Toileting Assessment/Training    [place] Level (Toileting) dependent (less than 25% patient effort);two person assist required  -TB      Toileting Position supine  -TB      Comment (Toileting) incontinent x2 episodes  -TB      Recorded by [TB] Jewell Phan, OT 04/14/18 1214 04/14/18 1214      Row Name 04/14/18 1141             BADL Safety/Performance    Impairments, BADL Safety/Performance balance;pain;range of motion;strength  -TB      Recorded by [TB] Jewell Phan, OT  "04/14/18 1214 04/14/18 1214      Row Name 04/14/18 1141             Therapeutic Exercise    Upper Extremity Range of Motion (Therapeutic Exercise) shoulder flexion/extension, bilateral;shoulder abduction/adduction, bilateral;shoulder horizontal abduction/adduction, bilateral;shoulder internal/external rotation, bilateral;elbow flexion/extension, bilateral;forearm supination/pronation, bilateral  -TB      Hand (Therapeutic Exercise) hand , bilateral  -TB      Exercise Type (Therapeutic Exercise) AROM (active range of motion)  -TB      Position (Therapeutic Exercise) supine  -TB      Sets/Reps (Therapeutic Exercise) 1/10   pt refused additional sets  -TB      Recorded by [TB] Jewell Phan, OT 04/14/18 1214 04/14/18 1214      Row Name 04/14/18 1141             Positioning and Restraints    Pre-Treatment Position in bed  -TB      Post Treatment Position bed  -TB      In Bed notified nsg;supine;call light within reach;encouraged to call for assist;exit alarm on  -TB      Recorded by [TB] Jewell Phan, OT 04/14/18 1214 04/14/18 1214      Row Name 04/14/18 1141             Pain Assessment    Additional Documentation Pain Scale: FACES Pre/Post-Treatment (Group)  -TB      Recorded by [TB] Jewell Phan, OT 04/14/18 1214 04/14/18 1214      Row Name 04/14/18 1141             Pain Scale: FACES Pre/Post-Treatment    Pain: FACES Scale, Pretreatment 2-->hurts little bit  -TB      Pain: FACES Scale, Post-Treatment 4-->hurts little more  -TB      Pre/Post Treatment Pain Comment Increased pain with minimal activity; pt refuses change of position due to \"anticipated pain\"  -TB      Recorded by [TB] Jewell Phan, OT 04/14/18 1214 04/14/18 1214      Row Name 04/14/18 1141             Pain Scale 2: Numbers Pre/Post-Treatment    Pain Location 2 - Side Right  -TB      Pain Location 2 hip  -TB      Recorded by [TB] Jewell Phan, OT 04/14/18 1214 04/14/18 1214      Row Name                Wound " 04/11/18 1922 Right leg incision    Wound - Properties Group Date first assessed: 04/11/18 [JV] Time first assessed: 1922 [JV] Side: Right [JV] Location: leg [JV] Type: incision [JV] Recorded by:  [JV] Ernst Babin RN 04/11/18 1922 04/11/18 1922    Row Name 04/14/18 1141             Coping    Observed Emotional State anxious  -TB      Verbalized Emotional State acceptance  -TB      Recorded by [TB] Jewell Phan, OT 04/14/18 1214 04/14/18 1214      Row Name 04/14/18 1141             Plan of Care Review    Plan of Care Reviewed With patient  -TB      Recorded by [TB] Jewell Phan OT 04/14/18 1214 04/14/18 1214      Row Name 04/14/18 1141             Outcome Summary/Treatment Plan (OT)    Daily Summary of Progress (OT) unable to show any progress toward functional goals  -TB      Barriers to Overall Progress (OT) confusion and pain  -TB      Plan for Continued Treatment (OT) Pt set to d/c to SNF today  -TB      Reason for Discharge (OT Discharge Summary) patient discharged from this facility  -TB      Recorded by [TB] Jewell Phan, MARCIE 04/14/18 1214 04/14/18 1214        User Key  (r) = Recorded By, (t) = Taken By, (c) = Cosigned By    Initials Name Effective Dates Discipline    TB Jewell Phan OT 06/22/15 -  OT    JV Ernst Babin RN 06/16/16 -  Nurse        Wound 04/11/18 1922 Right leg incision (Active)   Dressing Appearance intact;dry 4/14/2018  8:24 AM   Base dressing in place, unable to visualize 4/14/2018  6:33 AM   Periwound edematous;ecchymotic 4/14/2018  6:33 AM   Drainage Amount none 4/14/2018  8:24 AM             OT Rehab Goals     Row Name 04/14/18 1141             Bed Mobility Goal 1 (OT)    Progress/Outcomes (Bed Mobility Goal 1, OT) goal not met  -TB         Transfer Goal 1 (OT)    Progress/Outcome (Transfer Goal 1, OT) goal not met  -TB         Dressing Goal 1 (OT)    Progress/Outcome (Dressing Goal 1, OT) goal not met  -TB        User Key  (r) = Recorded By,  (t) = Taken By, (c) = Cosigned By    Initials Name Provider Type Discipline     Jewell Phan, OT Occupational Therapist OT          Occupational Therapy Education     Title: PT OT SLP Therapies (Active)     Topic: Occupational Therapy (Active)     Point: ADL training (Done)     Description: Instruct learner(s) on proper safety adaptation and remediation techniques during self care or transfers.   Instruct in proper use of assistive devices.   Learning Progress Summary     Learner Status Readiness Method Response Comment Documented by    Patient Done Acceptance E,D VU,NR Education reinforced for benefits of OOB activity/therapy, role of OT, THP and HEP TB 04/14/18 1215     Active Acceptance E,TB,D NR  AR 04/12/18 1556          Point: Precautions (Done)     Description: Instruct learner(s) on prescribed precautions during self-care and functional transfers.   Learning Progress Summary     Learner Status Readiness Method Response Comment Documented by    Patient Done Acceptance E,D VU,NR Education reinforced for benefits of OOB activity/therapy, role of OT, THP and HEP TB 04/14/18 1215     Active Acceptance E,TB,D NR  AR 04/12/18 1556          Point: Body mechanics (Active)     Description: Instruct learner(s) on proper positioning and spine alignment during self-care, functional mobility activities and/or exercises.   Learning Progress Summary     Learner Status Readiness Method Response Comment Documented by    Patient Active Acceptance E,TB,D NR  AR 04/12/18 1556                      User Key     Initials Effective Dates Name Provider Type Discipline     06/22/15 -  Jewell Phan, OT Occupational Therapist OT    AR 06/22/15 -  Kathleen Swan, OT Occupational Therapist OT                OT Recommendation and Plan  Outcome Summary/Treatment Plan (OT)  Daily Summary of Progress (OT): unable to show any progress toward functional goals  Barriers to Overall Progress (OT): confusion and pain  Plan  for Continued Treatment (OT): Pt set to d/c to SNF today  Anticipated Discharge Disposition (OT): skilled nursing facility (SNF)  Reason for Discharge (OT Discharge Summary): patient discharged from this facility  Daily Summary of Progress (OT): unable to show any progress toward functional goals  Plan of Care Review  Plan of Care Reviewed With: patient  Plan of Care Reviewed With: patient  Outcome Summary: Pt presents with ongoing confusion and pain limiting participation and progress. Pt u/a to meet goals this stay. Pt refused EOB/OOB activity. Dependent for LB ADLs. Pt is set to d/c to SNF today for continued rehab. OT to d/c at this time.          Outcome Measures     Row Name 04/14/18 1141 04/13/18 1352 04/12/18 1315       How much help from another person do you currently need...    Turning from your back to your side while in flat bed without using bedrails?  -- 2  -EH  --    Moving from lying on back to sitting on the side of a flat bed without bedrails?  -- 2  -EH  --    Moving to and from a bed to a chair (including a wheelchair)?  -- 1  -EH  --    Standing up from a chair using your arms (e.g., wheelchair, bedside chair)?  -- 2  -EH  --    Climbing 3-5 steps with a railing?  -- 1  -EH  --    To walk in hospital room?  -- 1  -EH  --    AM-PAC 6 Clicks Score  -- 9  -EH  --       How much help from another is currently needed...    Putting on and taking off regular lower body clothing? 1  -TB  -- 1  -AR    Bathing (including washing, rinsing, and drying) 2  -TB  -- 2  -AR    Toileting (which includes using toilet bed pan or urinal) 1  -TB  -- 1  -AR    Putting on and taking off regular upper body clothing 2  -TB  -- 2  -AR    Taking care of personal grooming (such as brushing teeth) 3  -TB  -- 3  -AR    Eating meals 3  -TB  -- 3  -AR    Score 12  -TB  -- 12  -AR       Functional Assessment    Outcome Measure Options AM-PAC 6 Clicks Daily Activity (OT)  -TB AM-PAC 6 Clicks Basic Mobility (PT)  -EH AM-PAC 6  Clicks Daily Activity (OT)  -AR    Row Name 04/12/18 1107             How much help from another person do you currently need...    Turning from your back to your side while in flat bed without using bedrails? 2  -EH      Moving from lying on back to sitting on the side of a flat bed without bedrails? 2  -EH      Moving to and from a bed to a chair (including a wheelchair)? 1  -EH      Standing up from a chair using your arms (e.g., wheelchair, bedside chair)? 2  -EH      Climbing 3-5 steps with a railing? 1  -EH      To walk in hospital room? 1  -EH      AM-PAC 6 Clicks Score 9  -EH         Functional Assessment    Outcome Measure Options AM-PAC 6 Clicks Basic Mobility (PT)  -EH        User Key  (r) = Recorded By, (t) = Taken By, (c) = Cosigned By    Initials Name Provider Type    TB Jewell Phan OT Occupational Therapist     Sasha Kelley, PT Physical Therapist    AR Kathleen Swan, OT Occupational Therapist           Time Calculation:          Time Calculation- OT     Row Name 04/14/18 1220             Time Calculation- OT    OT Start Time 1141  -TB      Total Timed Code Minutes- OT 23 minute(s)  -TB      OT Received On 04/14/18  -TB        User Key  (r) = Recorded By, (t) = Taken By, (c) = Cosigned By    Initials Name Provider Type    TB Jewell Phan OT Occupational Therapist          Therapy Charges for Today     Code Description Service Date Service Provider Modifiers Qty    [id number]  OT THERAPEUTIC ACT EA 15 MIN 4/14/2018 Jewell Phan OT GO 2               OT Discharge Summary  Anticipated Discharge Disposition (OT): skilled nursing facility (SNF)  Reason for Discharge: Discharge from facility  Outcomes Achieved: Unable to make functional progress toward goals at this time  Discharge Destination: SNF    Jewell Phan OT  4/14/2018

## 2018-04-14 NOTE — PROGRESS NOTES
"Orthopaedic Surgery Hip Fracture Post-Op Progress Note      LOS: 4 days   Patient Care Team:  Perez Pedraza MD as PCP - General (Internal Medicine)    POD 3    Subjective     Interval History:   Patient complaining of pain in the right hip.  She says she has pain when people touch her hip.  She denies chest pain or shortness of breath.  She tells me she feels confused but is oriented to place and time.    Objective     Vital Signs:  Temp (24hrs), Av.4 °F (36.9 °C), Min:97.9 °F (36.6 °C), Max:98.8 °F (37.1 °C)    /66   Pulse 68   Temp 98.8 °F (37.1 °C) (Oral)   Resp 18   Ht 160 cm (63\")   Wt 56.7 kg (125 lb 1.6 oz)   SpO2 92%   BMI 22.16 kg/m²     Labs:  Lab Results (last 24 hours)     Procedure Component Value Units Date/Time    CBC & Differential [[id number]] Collected:  18    Specimen:  Blood Updated:  18    Narrative:       The following orders were created for panel order CBC & Differential.  Procedure                               Abnormality         Status                     ---------                               -----------         ------                     CBC Auto Differential[[id number]]        Abnormal            Final result                 Please view results for these tests on the individual orders.    CBC Auto Differential [[id number]]  (Abnormal) Collected:  18    Specimen:  Blood Updated:  18     WBC 9.32 10*3/mm3      RBC 2.94 (L) 10*6/mm3      Hemoglobin 8.4 (L) g/dL      Hematocrit 25.0 (L) %      MCV 85.0 fL      MCH 28.6 pg      MCHC 33.6 g/dL      RDW 18.0 (H) %      RDW-SD 56.0 (H) fl      MPV 10.8 fL      Platelets 123 (L) 10*3/mm3      Neutrophil % 75.0 (H) %      Lymphocyte % 14.1 (L) %      Monocyte % 8.7 %      Eosinophil % 1.7 %      Basophil % 0.2 %      Immature Grans % 0.3 %      Neutrophils, Absolute 6.99 10*3/mm3      Lymphocytes, Absolute 1.31 10*3/mm3      Monocytes, Absolute 0.81 10*3/mm3      Eosinophils, Absolute " 0.16 10*3/mm3      Basophils, Absolute 0.02 10*3/mm3      Immature Grans, Absolute 0.03 10*3/mm3           Physical Exam:  EHL, FHL, gastroc soleus, and tibialis anterior are intact  Toes are pink and warm  Surgical dressing is in place with evidence of serosanguineous drainage  Calf is soft and non tender  No pain with gentle ROM of the hip    Assessment/Plan     Postoperative day number 3 status post long trochanteric nail for reverse obliquity intertrochanteric femur fracture .    Labs: Hematocrit 25 this morning.  Platelet count 123,000.    Pain Control: Suboptimal.  Patient does have a fascia iliaca block in place.  Defer to tolerate the pain rather than increase by mouth pain meds and run the risk of delirium.    PT and OT     Weight Bearing Status: Weight-bear as tolerated right lower extremity for transfers only.  Touch weightbearing right lower extremity at all other times.    DVT prophylaxis: Low-dose Lovenox.  Will change dose starting tomorrow morning.    Patient is having drainage from her incisions likely due to the Lovenox and preoperative Plavix.  Recommend daily dressing changes until the incisions stopped draining.  We will begin that today.  Coverderm dressings over each incision daily and prn.      Discharge planning: Back to [facility] today or tomorrow.  Patient is having some mild urinary retention and constipation.  If that resolves she may go today.  Most likely she will go tomorrow.    Upon discharge, patient will need follow-up with me in the PA clinic in 2 weeks' time.  Continue DVTpx for 4 weeks.  Continue daily dressing changes until follow-up.      Call with questions or concerns.    Eile Walsh MD  04/14/18  10:07 AM

## 2018-04-15 VITALS
BODY MASS INDEX: 22.16 KG/M2 | HEIGHT: 63 IN | DIASTOLIC BLOOD PRESSURE: 80 MMHG | SYSTOLIC BLOOD PRESSURE: 162 MMHG | OXYGEN SATURATION: 90 % | TEMPERATURE: 97 F | HEART RATE: 75 BPM | RESPIRATION RATE: 18 BRPM | WEIGHT: 125.1 LBS

## 2018-04-15 PROBLEM — S72.141A CLOSED DISPLACED INTERTROCHANTERIC FRACTURE OF RIGHT FEMUR (HCC): Status: RESOLVED | Noted: 2018-04-10 | Resolved: 2018-04-15

## 2018-04-15 PROBLEM — S72.009A HIP FRACTURE (HCC): Status: RESOLVED | Noted: 2018-04-10 | Resolved: 2018-04-15

## 2018-04-15 LAB
BASOPHILS # BLD AUTO: 0.02 10*3/MM3 (ref 0–0.2)
BASOPHILS NFR BLD AUTO: 0.3 % (ref 0–1)
DEPRECATED RDW RBC AUTO: 55.5 FL (ref 37–54)
EOSINOPHIL # BLD AUTO: 0.17 10*3/MM3 (ref 0–0.3)
EOSINOPHIL NFR BLD AUTO: 2.3 % (ref 0–3)
ERYTHROCYTE [DISTWIDTH] IN BLOOD BY AUTOMATED COUNT: 17.6 % (ref 11.3–14.5)
HCT VFR BLD AUTO: 25 % (ref 34.5–44)
HGB BLD-MCNC: 8.1 G/DL (ref 11.5–15.5)
IMM GRANULOCYTES # BLD: 0.02 10*3/MM3 (ref 0–0.03)
IMM GRANULOCYTES NFR BLD: 0.3 % (ref 0–0.6)
LYMPHOCYTES # BLD AUTO: 1.47 10*3/MM3 (ref 0.6–4.8)
LYMPHOCYTES NFR BLD AUTO: 19.6 % (ref 24–44)
MCH RBC QN AUTO: 28.1 PG (ref 27–31)
MCHC RBC AUTO-ENTMCNC: 32.4 G/DL (ref 32–36)
MCV RBC AUTO: 86.8 FL (ref 80–99)
MONOCYTES # BLD AUTO: 0.59 10*3/MM3 (ref 0–1)
MONOCYTES NFR BLD AUTO: 7.9 % (ref 0–12)
NEUTROPHILS # BLD AUTO: 5.23 10*3/MM3 (ref 1.5–8.3)
NEUTROPHILS NFR BLD AUTO: 69.6 % (ref 41–71)
PLATELET # BLD AUTO: 154 10*3/MM3 (ref 150–450)
PMV BLD AUTO: 10 FL (ref 6–12)
RBC # BLD AUTO: 2.88 10*6/MM3 (ref 3.89–5.14)
WBC NRBC COR # BLD: 7.5 10*3/MM3 (ref 3.5–10.8)

## 2018-04-15 PROCEDURE — 99239 HOSP IP/OBS DSCHRG MGMT >30: CPT | Performed by: NURSE PRACTITIONER

## 2018-04-15 PROCEDURE — 25010000002 ENOXAPARIN PER 10 MG: Performed by: ORTHOPAEDIC SURGERY

## 2018-04-15 PROCEDURE — 85025 COMPLETE CBC W/AUTO DIFF WBC: CPT | Performed by: HOSPITALIST

## 2018-04-15 RX ORDER — HYDROCODONE BITARTRATE AND ACETAMINOPHEN 5; 325 MG/1; MG/1
1 TABLET ORAL EVERY 6 HOURS PRN
Qty: 12 TABLET | Refills: 0 | Status: SHIPPED | OUTPATIENT
Start: 2018-04-15 | End: 2018-07-19

## 2018-04-15 RX ORDER — PSEUDOEPHEDRINE HCL 30 MG
100 TABLET ORAL 2 TIMES DAILY
Start: 2018-04-15

## 2018-04-15 RX ORDER — BISACODYL 10 MG
10 SUPPOSITORY, RECTAL RECTAL DAILY PRN
Start: 2018-04-15

## 2018-04-15 RX ORDER — ACETAMINOPHEN 325 MG/1
650 TABLET ORAL EVERY 8 HOURS
Start: 2018-04-15

## 2018-04-15 RX ADMIN — ATENOLOL 50 MG: 50 TABLET ORAL at 08:24

## 2018-04-15 RX ADMIN — ENOXAPARIN SODIUM 30 MG: 30 INJECTION SUBCUTANEOUS at 08:24

## 2018-04-15 RX ADMIN — ACETAMINOPHEN 650 MG: 325 TABLET ORAL at 08:24

## 2018-04-15 RX ADMIN — POLYETHYLENE GLYCOL (3350) 17 G: 17 POWDER, FOR SOLUTION ORAL at 08:25

## 2018-04-15 RX ADMIN — ACETAMINOPHEN 650 MG: 325 TABLET ORAL at 01:00

## 2018-04-15 RX ADMIN — MULTIPLE VITAMINS W/ MINERALS TAB 1 TABLET: TAB ORAL at 08:24

## 2018-04-15 NOTE — PROGRESS NOTES
[facility]    Acute pain service Inpatient Progress Note    Patient Name: eLydi Agosto  :  1928  MRN:  [id number]        Acute Pain  Service Inpatient Progress Note:    Analgesia:Good  Pain Score:0/10  LOC: alert and awake  Resp Status: room air  Cardiac: VS stable  Side Effects:None  Catheter Site:clean, dressing intact and dry  Cath type: peripheral nerve cath(MOOG pump)  Infusion rate: 12ml/hr  Catheter Plan:Catheter to remain Insitu and Continue catheter infusion rate unchanged

## 2018-04-15 NOTE — DISCHARGE SUMMARY
"    [facility] Medicine Services  DISCHARGE SUMMARY    Patient Name: Leydi Agosto  : 1928  MRN: [id number]    Date of Admission: 4/10/2018  Date of Discharge: 4/15/2018  Primary Care Physician: Perez Pedraza MD    Consults     No orders found from 3/12/2018 to 2018.        Hospital Course     Presenting Problem:   Hip fracture [S72.009A]    Active Hospital Problems (** Indicates Principal Problem)    Diagnosis Date Noted   • Urinary retention [R33.9] 2018      Resolved Hospital Problems    Diagnosis Date Noted Date Resolved   • Hip fracture [S72.009A] 04/10/2018 04/15/2018   • Closed displaced intertrochanteric fracture of right femur [S72.141A] 04/10/2018 04/15/2018          Hospital Course:  Leydi Agosto is a 89 y.o. female presented to ED after fall.  ED eval revealed right hip fracture.  She was admitted to the hospitalist service and orthopedics was consulted.  She was taken to the OR 18 without complications.  She did have some post-op anemia and received a total of 3 units PRBC (1 in operating room, 2 on 18).  She has had increased serosanguinous drainage from her surgical site but is felt to be stable and ready for transfer.  She is to have daily and prn dressing changes until follow up.  Her nerve cathether will be dc'd prior to transfer and has been using minimal pain meds.  Rx for [place] on chart.  Nursing discussed with ortho regarding her plavix and this can be restarted.  She should have CBC Wednesday to ensure H/H remains stable.  She is still having issues with urinary retention, but per patient report this is a chronic issues.  If continues, follow up with urology as outpatient.  Last BM 18    Procedure(s):  HIP TROCANTERIC NAILING WITH INTRAMEDULLARY HIP SCREW       Day of Discharge     HPI:   Hospital follow s/p right hip repair  \"Just hurts when I move it\"    Review of Systems  Gen- No fevers, chills  CV- No chest pain, palpitations  Resp- " No cough, dyspnea  GI- No N/V/D, abd pain      Otherwise ROS is negative except as mentioned in the HPI.    Vital Signs:   Temp:  [97 °F (36.1 °C)-98.9 °F (37.2 °C)] 97 °F (36.1 °C)  Heart Rate:  [62-75] 75  Resp:  [18] 18  BP: (122-162)/(69-80) 162/80     Physical Exam:  Constitutional: No acute distress, awake, alert  HENT: NCAT, mucous membranes moist  Respiratory: Clear to auscultation, decreased in bases, respiratory effort normal   Cardiovascular: RRR, no murmurs, rubs, or gallops, palpable pedal pulses bilaterally  Gastrointestinal: Positive bowel sounds, soft, nontender, nondistended  Musculoskeletal: No bilateral ankle edema  Psychiatric: Flat affect, cooperative  Neurologic: Oriented x 3, strength symmetric in upper extremities, speech clear  Skin: RLE dressings with minimal serosanguinous drainage      Pertinent  and/or Most Recent Results       Results from last 7 days  Lab Units 04/15/18  0537 04/14/18  0630 04/13/18  0628 04/12/18  0824 04/12/18  0555 04/11/18  0650 04/11/18  0649 04/10/18  1928   WBC 10*3/mm3 7.50 9.32 10.42  --  10.44 11.44*  --  7.62   HEMOGLOBIN g/dL 8.1* 8.4* 9.3*  --  7.1* 9.4*  --  11.6   HEMATOCRIT % 25.0* 25.0* 28.9*  --  22.4* 29.7*  --  36.3   PLATELETS 10*3/mm3 154 123* 93*  --  117* 186  --  208   SODIUM mmol/L  --   --  139 141  --   --  141 140   POTASSIUM mmol/L  --   --  3.6 3.6  --   --  3.6 3.7   CHLORIDE mmol/L  --   --  111* 111*  --   --  111* 108   CO2 mmol/L  --   --  20.0 22.0  --   --  25.0 24.0   BUN mg/dL  --   --  11 12  --   --  16 13   CREATININE mg/dL  --   --  0.50* 0.60  --   --  0.70 0.90   GLUCOSE mg/dL  --   --  128* 131*  --   --  130* 196*   CALCIUM mg/dL  --   --  7.6* 7.1*  --   --  8.0* 8.2*       Results from last 7 days  Lab Units 04/11/18  0649 04/10/18  1928   BILIRUBIN mg/dL 0.3 0.3   ALK PHOS U/L 114* 134*   ALT (SGPT) U/L 23 22   AST (SGOT) U/L 21 27   PROTIME Seconds 11.5  --    INR  1.10*  --          Brief Urine Lab Results  (Last  result in the past 365 days)      Color   Clarity   Blood   Leuk Est   Nitrite   Protein   CREAT   Urine HCG        04/12/18 2348 Yellow Clear Negative Negative Negative Negative               Microbiology Results Abnormal     None          Imaging Results (all)     Procedure Component Value Units Date/Time    FL C Arm During Surgery [[id number]] Collected:  04/12/18 0945     Updated:  04/12/18 1140    Narrative:       EXAMINATION: FLUOROSCOPY C-ARM DURING SURGERY-04/11/2018:     INDICATION: Trochanteric nail ; W19.XXXA-Unspecified fall, initial  encounter; S72.001A-Fracture of unspecified part of neck of right femur,  initial encounter for closed fracture; Z86.79-Personal history of other  diseases of the circulatory system; I10-Essential (primary)  hypertension; S72.141A-Displaced intertrochanteric fracture of right  femur, initial encounter for closed fracture.      TECHNIQUE: Intraoperative fluoroscopy for improved localization and  treatment planning.     COMPARISON: Radiographs earlier same day.     FINDINGS: Intraoperative fluoroscopy with total fluoroscopic time usage  3 minutes and 11 seconds and 11 representative images saved during right  hip ORIF and trochanteric nailing spanning the right subtrochanteric  femoral fracture.       Impression:       Intraoperative fluoroscopy utilized during right hip ORIF.     D:  04/12/2018  E:  04/12/2018            This report was finalized on 4/12/2018 11:38 AM by Dr. Tuan Quiroga.       XR Femur 2 View Right [[id number]] Collected:  04/11/18 1117     Updated:  04/12/18 1005    Narrative:       EXAMINATION: XR FEMUR 2 VW, RIGHT-04/11/2018:      INDICATION: Evaluate femur/total knee implant; W19.XXXA-Unspecified  fall, initial encounter; S72.001A-Fracture of unspecified part of neck  of right femur, initial encounter for closed fracture; Z86.79-Personal  history of other diseases of the circulatory system; I10-Essential  (primary) hypertension; S72.141A-Displaced  intertrochanteric fracture of  right femur, initial encounter for closed fracture.      COMPARISON: NONE.     FINDINGS: Two views of the right femur reveal hardware seen in the  distal femur from total knee arthroplasty. The tibial hardware has been  removed. There is fracture seen of the proximal femoral shaft in the  subtrochanteric portion. No significant change in the alignment. Soft  tissue swelling identified. Vascular calcification seen within the soft  tissues.           Impression:       Subtrochanteric fracture seen of the right femur. There is  hardware seen in the distal femur from knee arthroplasty.     D:  04/11/2018  E:  04/11/2018     This report was finalized on 4/12/2018 10:03 AM by Dr. Shanika Durant MD.       FL > 1 Hour [[id number]] Resulted:  04/11/18 1953     Updated:  04/11/18 1953    Narrative:       This procedure was auto-finalized with no dictation required.    XR Hip With or Without Pelvis 2 - 3 View Right [[id number]] Collected:  04/10/18 1906     Updated:  04/10/18 2050    Narrative:       EXAM:    XR Right Hip With Pelvis When Performed, 2 or 3 Views    CLINICAL HISTORY:    89 years old, female; Pain;  trauma, initial encounter, Hip pain; Right hip;   Additional info: Fall injury with external rotation/shortening of rle    TECHNIQUE:    Two or three views of the right hip, with pelvis when performed.    COMPARISON:    No relevant prior studies available.    FINDINGS:    Bones/joints:  Acute, superomedially displaced, mildly comminuted oblique   right femoral fracture through the inferior trochanter to the subtrochanteric   region laterally.  Old, healed left inferior and superior pubic rami fractures.    No dislocation.    Soft tissues: Pelvic phleboliths.    Gastrointestinal tract:  Moderate amount of stool in the colon, correlate for   constipation.      Impression:       1.  Acute, superomedially displaced, mildly comminuted, oblique right femoral   fracture through the inferior  trochanter to the subtrochanteric region   laterally.  2.  Moderate amount of stool in the colon, correlate for constipation.    THIS DOCUMENT HAS BEEN ELECTRONICALLY SIGNED BY RAMIN SOW MD    XR Chest 1 View [[id number]] Collected:  04/10/18 1907     Updated:  04/10/18 2038    Narrative:       EXAM:    XR Chest, 1 View    CLINICAL HISTORY:    89 years old, female; Signs and symptoms; Other: Probable hip FX; Pre-op    TECHNIQUE:    Frontal view of the chest.    COMPARISON:    No relevant prior studies available.    FINDINGS:    Lungs:  Unremarkable.  No consolidation.    Pleural space:  Unremarkable.  No pneumothorax.    Heart:  Unremarkable.  No cardiomegaly.    Mediastinum:  Unremarkable.    Bones/joints:  Thoracolumbar vertebroplasty.  Old, callused left lateral rib   fractures with mild chest wall deformity.  Dextrocurvature of the thoracic   spine, positioning versus mild scoliosis.      Impression:         No acute abnormality.    THIS DOCUMENT HAS BEEN ELECTRONICALLY SIGNED BY RAMIN SOW MD              Discharge Details      Leydi Agosto   Home Medication Instructions GISELLE:[id number]    Printed on:04/15/18 1225   Medication Information                      acetaminophen (TYLENOL) 325 MG tablet  Take 2 tablets by mouth Every 8 (Eight) Hours. Change to prn dosing as able.             atenolol (TENORMIN) 50 MG tablet  Take 50 mg by mouth Daily.             atorvastatin (LIPITOR) 20 MG tablet  Take 20 mg by mouth Daily.             bisacodyl (DULCOLAX) 10 MG suppository  Insert 1 suppository into the rectum Daily As Needed for Constipation.             clopidogrel (PLAVIX) 75 MG tablet  Take 75 mg by mouth Daily.             docusate sodium 100 MG capsule  Take 100 mg by mouth 2 (Two) Times a Day.             enoxaparin (LOVENOX) 30 MG/0.3ML solution syringe  Inject 0.3 mL under the skin Daily for 28 days.             HYDROcodone-acetaminophen (NORCO) 5-325 MG per tablet  Take 1 tablet by mouth Every 6 (Six)  Hours As Needed for Severe Pain .             Multiple Vitamins-Minerals (MULTIVITAMIN WOMEN 50+ PO)  Take  by mouth.             polyethylene glycol (MIRALAX) powder  Take 17 g by mouth Daily.                   Discharge Disposition:  Skilled Nursing Facility (DC - External)    Discharge Diet:  Diet Instructions     Diet: Regular; Thin       Discharge Diet:  Regular    Fluid Consistency:  Thin          Discharge Activity:   Activity Instructions     Discharge Activity Restrictions       Weight-bear as tolerated right lower extremity for transfers only.  Touch weightbearing right lower extremity at all other times.          No future appointments.    Additional Instructions for the Follow-ups that You Need to Schedule     Discharge Follow-up with PCP    As directed      Follow Up Details:  1 week         Discharge Follow-up with Specialty: Yue; 2 Weeks    As directed      Specialty:  Yue    Follow Up:  2 Weeks         CBC & Differential     Apr 18, 2018 (Approximate)      Manual Differential:  No               Time Spent on Discharge:  35 minutes    Electronically signed by TERESITA Garcia, 04/15/18, 12:28 PM.

## 2018-04-15 NOTE — PROGRESS NOTES
Continued Stay Note  [facility]     Patient Name: Leydi Agosto  MRN: [id number]  Today's Date: 4/15/2018    Admit Date: 4/10/2018          Discharge Plan     Row Name 04/15/18 1244       Plan    Plan Discharge Summary faxed    Plan Comments Faxed D/C summary to [address] [facility] at F: [phone]. Fatoumata Maguire RN [phone]    Row Name 04/15/18 1204       Plan    Plan [facility] ambulance rescheduled to 3:00 PM today    Plan Comments Spoke with Jessica at [facility] (P: [phone]) and rescheduled ambulance from 1:00 PM to 3:00 PM today per APRN and bedside RN's request. Discussed with [facility] and they said this was ok. Fatoumata Maguire RN [phone]              Discharge Codes    No documentation.       Expected Discharge Date and Time     Expected Discharge Date Expected Discharge Time    Apr 15, 2018             Fatoumata Maguire RN

## 2018-04-15 NOTE — THERAPY DISCHARGE NOTE
Acute Care - Physical Therapy Discharge Summary  [facility]       Patient Name: Leydi Agosto  : 1928  MRN: [id number]    Today's Date: 4/15/2018  Onset of Illness/Injury or Date of Surgery: 04/10/18    Date of Referral to PT: 18  Referring Physician: Dr. Walsh      Admit Date: 4/10/2018      PT Recommendation and Plan    Visit Dx:    ICD-10-CM ICD-9-CM   1. Fall, initial encounter W19.XXXA E888.9   2. Closed fracture of right hip, initial encounter S72.001A 820.8   3. History of coronary artery disease Z86.79 V12.59   4. Essential hypertension I10 401.9   5. Closed displaced intertrochanteric fracture of right femur, initial encounter S72.141A 820.21   6. Impaired functional mobility, balance, gait, and endurance Z74.09 V49.89   7. Impaired mobility and ADLs Z74.09 799.89   8. Anemia, unspecified type D64.9 285.9             Outcome Measures     Row Name 18 1547 18 1141 18 1352       How much help from another person do you currently need...    Turning from your back to your side while in flat bed without using bedrails? 2  -SC  -- 2  -EH    Moving from lying on back to sitting on the side of a flat bed without bedrails? 1  -SC  -- 2  -EH    Moving to and from a bed to a chair (including a wheelchair)? 1  -SC  -- 1  -EH    Standing up from a chair using your arms (e.g., wheelchair, bedside chair)? 1  -SC  -- 2  -EH    Climbing 3-5 steps with a railing? 1  -SC  -- 1  -EH    To walk in hospital room? 1  -SC  -- 1  -EH    AM-PAC 6 Clicks Score 7  -SC  -- 9  -EH       How much help from another is currently needed...    Putting on and taking off regular lower body clothing?  -- 1  -TB  --    Bathing (including washing, rinsing, and drying)  -- 2  -TB  --    Toileting (which includes using toilet bed pan or urinal)  -- 1  -TB  --    Putting on and taking off regular upper body clothing  -- 2  -TB  --    Taking care of personal grooming (such as brushing teeth)  -- 3  -TB  --    Eating  meals  -- 3  -TB  --    Score  -- 12  -TB  --       Functional Assessment    Outcome Measure Options AM-PAC 6 Clicks Basic Mobility (PT)  -SC AM-PAC 6 Clicks Daily Activity (OT)  -TB AM-PAC 6 Clicks Basic Mobility (PT)  -EH      User Key  (r) = Recorded By, (t) = Taken By, (c) = Cosigned By    Initials Name Provider Type    SC Marissa Lilly, PT Physical Therapist    TB Jewell Phan, OT Occupational Therapist     Sasha Kelley, PT Physical Therapist                      PT Rehab Goals     Row Name 04/15/18 2036             Bed Mobility Goal 1 (PT)    Activity/Assistive Device (Bed Mobility Goal 1, PT) bed mobility activities, all;sit to supine;supine to sit  -LR      [place] Level/Cues Needed (Bed Mobility Goal 1, PT) minimum assist (75% or more patient effort)  -LR      Time Frame (Bed Mobility Goal 1, PT) 2 weeks;long term goal (LTG)  -LR      Progress/Outcomes (Bed Mobility Goal 1, PT) goal not met  -LR         Transfer Goal 1 (PT)    Activity/Assistive Device (Transfer Goal 1, PT) sit-to-stand/stand-to-sit  -LR      [place] Level/Cues Needed (Transfer Goal 1, PT) moderate assist (50-74% patient effort)  -LR      Time Frame (Transfer Goal 1, PT) long term goal (LTG);2 weeks  -LR      Progress/Outcome (Transfer Goal 1, PT) goal not met  -LR         Gait Training Goal 1 (PT)    Activity/Assistive Device (Gait Training Goal 1, PT) gait (walking locomotion);walker, rolling  -LR      [place] Level (Gait Training Goal 1, PT) moderate assist (50-74% patient effort)  -LR      Distance (Gait Goal 1, PT) 10  -LR      Time Frame (Gait Training Goal 1, PT) 2 weeks;long term goal (LTG)  -LR      Progress/Outcome (Gait Training Goal 1, PT) goal not met  -LR        User Key  (r) = Recorded By, (t) = Taken By, (c) = Cosigned By    Initials Name Provider Type Discipline    LR Eileen Cox, PT Physical Therapist PT              PT Discharge Summary  Anticipated Discharge Disposition (PT):  skilled nursing facility (SNF)  Reason for Discharge: Discharge from facility  Outcomes Achieved: Refer to plan of care for updates on goals achieved  Discharge Destination: SNF      Eileen Cox, PT   4/15/2018

## 2018-04-15 NOTE — DISCHARGE PLACEMENT REQUEST
"Fatoumata Maguire RN  Case Management  P: [phone]    Discharge summary attached. Thanks.      Sadaf Falk (89 y.o. Female)     Date of Birth Social Security Number Address Home Phone MRN    04/28/1928  [address] [place] [address] [phone] [id number]    Alevism Marital Status          None        Admission Date Admission Type Admitting Provider Attending Provider Department, Room/Bed    4/10/18 Emergency Jeanette White MD Mini, Jocelyn, MD [facility] 3H, S383/1    Discharge Date Discharge Disposition Discharge Destination         Skilled Nursing Facility (DC - External)              Attending Provider:  Jeanette White MD    Allergies:  No Known Allergies    Isolation:  None   Infection:  None   Code Status:  FULL    Ht:  160 cm (63\")   Wt:  56.7 kg (125 lb 1.6 oz)    Admission Cmt:  None   Principal Problem:  None                Active Insurance as of 4/10/2018     Primary Coverage     Payor Plan Insurance Group Employer/Plan Group    MEDICARE MEDICARE A & B      Payor Plan Address Payor Plan Phone Number Effective From Effective To    [address] [phone] 6/1/1993     [place] [address]       Subscriber Name Subscriber Birth Date Member ID       SADAF FALK 4/28/1928 [id number]           Secondary Coverage     Payor Plan Insurance Group Employer/Plan Group    [employer]      Payor Plan Address Payor Plan Phone Number Effective From Effective To    [address] [phone] 1/1/2018     [place] [id number]       Subscriber Name Subscriber Birth Date Member ID       SADAF FALK 4/28/1928 [id number]                 Emergency Contacts      (Rel.) Home Phone Work Phone Mobile Phone    Sandra Hurtado (Power of ) [phone] -- --    Leeann Garcia (Daughter) [phone] -- --               Discharge Summary      TERESITA Garcia at 4/15/2018 11:30 AM              [facility] Medicine " "Services  DISCHARGE SUMMARY    Patient Name: Leydi Agosto  : 1928  MRN: [id number]    Date of Admission: 4/10/2018  Date of Discharge: 4/15/2018  Primary Care Physician: Perez Pedraza MD    Consults     No orders found from 3/12/2018 to 2018.        Hospital Course     Presenting Problem:   Hip fracture [S72.009A]    Active Hospital Problems (** Indicates Principal Problem)    Diagnosis Date Noted   • Urinary retention [R33.9] 2018      Resolved Hospital Problems    Diagnosis Date Noted Date Resolved   • Hip fracture [S72.009A] 04/10/2018 04/15/2018   • Closed displaced intertrochanteric fracture of right femur [S72.141A] 04/10/2018 04/15/2018          Hospital Course:  Leydi Agosto is a 89 y.o. female presented to ED after fall.  ED eval revealed right hip fracture.  She was admitted to the hospitalist service and orthopedics was consulted.  She was taken to the OR 18 without complications.  She did have some post-op anemia and received a total of 3 units PRBC (1 in operating room, 2 on 18).  She has had increased serosanguinous drainage from her surgical site but is felt to be stable and ready for transfer.  She is to have daily and prn dressing changes until follow up.  Her nerve cathether will be dc'd prior to transfer and has been using minimal pain meds.  Rx for [place] on chart.  Nursing discussed with ortho regarding her plavix and this can be restarted.  She should have CBC Wednesday to ensure H/H remains stable.  She is still having issues with urinary retention, but per patient report this is a chronic issues.  If continues, follow up with urology as outpatient.  Last BM 18    Procedure(s):  HIP TROCANTERIC NAILING WITH INTRAMEDULLARY HIP SCREW       Day of Discharge     HPI:   Hospital follow s/p right hip repair  \"Just hurts when I move it\"    Review of Systems  Gen- No fevers, chills  CV- No chest pain, palpitations  Resp- No cough, dyspnea  GI- No N/V/D, abd " pain      Otherwise ROS is negative except as mentioned in the HPI.    Vital Signs:   Temp:  [97 °F (36.1 °C)-98.9 °F (37.2 °C)] 97 °F (36.1 °C)  Heart Rate:  [62-75] 75  Resp:  [18] 18  BP: (122-162)/(69-80) 162/80     Physical Exam:  Constitutional: No acute distress, awake, alert  HENT: NCAT, mucous membranes moist  Respiratory: Clear to auscultation, decreased in bases, respiratory effort normal   Cardiovascular: RRR, no murmurs, rubs, or gallops, palpable pedal pulses bilaterally  Gastrointestinal: Positive bowel sounds, soft, nontender, nondistended  Musculoskeletal: No bilateral ankle edema  Psychiatric: Flat affect, cooperative  Neurologic: Oriented x 3, strength symmetric in upper extremities, speech clear  Skin: RLE dressings with minimal serosanguinous drainage      Pertinent  and/or Most Recent Results       Results from last 7 days  Lab Units 04/15/18  0537 04/14/18  0630 04/13/18  0628 04/12/18  0824 04/12/18  0555 04/11/18  0650 04/11/18  0649 04/10/18  1928   WBC 10*3/mm3 7.50 9.32 10.42  --  10.44 11.44*  --  7.62   HEMOGLOBIN g/dL 8.1* 8.4* 9.3*  --  7.1* 9.4*  --  11.6   HEMATOCRIT % 25.0* 25.0* 28.9*  --  22.4* 29.7*  --  36.3   PLATELETS 10*3/mm3 154 123* 93*  --  117* 186  --  208   SODIUM mmol/L  --   --  139 141  --   --  141 140   POTASSIUM mmol/L  --   --  3.6 3.6  --   --  3.6 3.7   CHLORIDE mmol/L  --   --  111* 111*  --   --  111* 108   CO2 mmol/L  --   --  20.0 22.0  --   --  25.0 24.0   BUN mg/dL  --   --  11 12  --   --  16 13   CREATININE mg/dL  --   --  0.50* 0.60  --   --  0.70 0.90   GLUCOSE mg/dL  --   --  128* 131*  --   --  130* 196*   CALCIUM mg/dL  --   --  7.6* 7.1*  --   --  8.0* 8.2*       Results from last 7 days  Lab Units 04/11/18  0649 04/10/18  1928   BILIRUBIN mg/dL 0.3 0.3   ALK PHOS U/L 114* 134*   ALT (SGPT) U/L 23 22   AST (SGOT) U/L 21 27   PROTIME Seconds 11.5  --    INR  1.10*  --          Brief Urine Lab Results  (Last result in the past 365 days)      Color    Clarity   Blood   Leuk Est   Nitrite   Protein   CREAT   Urine HCG        04/12/18 2348 Yellow Clear Negative Negative Negative Negative               Microbiology Results Abnormal     None          Imaging Results (all)     Procedure Component Value Units Date/Time    FL C Arm During Surgery [[id number]] Collected:  04/12/18 0945     Updated:  04/12/18 1140    Narrative:       EXAMINATION: FLUOROSCOPY C-ARM DURING SURGERY-04/11/2018:     INDICATION: Trochanteric nail ; W19.XXXA-Unspecified fall, initial  encounter; S72.001A-Fracture of unspecified part of neck of right femur,  initial encounter for closed fracture; Z86.79-Personal history of other  diseases of the circulatory system; I10-Essential (primary)  hypertension; S72.141A-Displaced intertrochanteric fracture of right  femur, initial encounter for closed fracture.      TECHNIQUE: Intraoperative fluoroscopy for improved localization and  treatment planning.     COMPARISON: Radiographs earlier same day.     FINDINGS: Intraoperative fluoroscopy with total fluoroscopic time usage  3 minutes and 11 seconds and 11 representative images saved during right  hip ORIF and trochanteric nailing spanning the right subtrochanteric  femoral fracture.       Impression:       Intraoperative fluoroscopy utilized during right hip ORIF.     D:  04/12/2018  E:  04/12/2018            This report was finalized on 4/12/2018 11:38 AM by Dr. Tuan Quiroga.       XR Femur 2 View Right [[id number]] Collected:  04/11/18 1117     Updated:  04/12/18 1005    Narrative:       EXAMINATION: XR FEMUR 2 VW, RIGHT-04/11/2018:      INDICATION: Evaluate femur/total knee implant; W19.XXXA-Unspecified  fall, initial encounter; S72.001A-Fracture of unspecified part of neck  of right femur, initial encounter for closed fracture; Z86.79-Personal  history of other diseases of the circulatory system; I10-Essential  (primary) hypertension; S72.141A-Displaced intertrochanteric fracture of  right femur,  initial encounter for closed fracture.      COMPARISON: NONE.     FINDINGS: Two views of the right femur reveal hardware seen in the  distal femur from total knee arthroplasty. The tibial hardware has been  removed. There is fracture seen of the proximal femoral shaft in the  subtrochanteric portion. No significant change in the alignment. Soft  tissue swelling identified. Vascular calcification seen within the soft  tissues.           Impression:       Subtrochanteric fracture seen of the right femur. There is  hardware seen in the distal femur from knee arthroplasty.     D:  04/11/2018  E:  04/11/2018     This report was finalized on 4/12/2018 10:03 AM by Dr. Shanika Durant MD.       FL > 1 Hour [[id number]] Resulted:  04/11/18 1953     Updated:  04/11/18 1953    Narrative:       This procedure was auto-finalized with no dictation required.    XR Hip With or Without Pelvis 2 - 3 View Right [[id number]] Collected:  04/10/18 1906     Updated:  04/10/18 2050    Narrative:       EXAM:    XR Right Hip With Pelvis When Performed, 2 or 3 Views    CLINICAL HISTORY:    89 years old, female; Pain;  trauma, initial encounter, Hip pain; Right hip;   Additional info: Fall injury with external rotation/shortening of rle    TECHNIQUE:    Two or three views of the right hip, with pelvis when performed.    COMPARISON:    No relevant prior studies available.    FINDINGS:    Bones/joints:  Acute, superomedially displaced, mildly comminuted oblique   right femoral fracture through the inferior trochanter to the subtrochanteric   region laterally.  Old, healed left inferior and superior pubic rami fractures.    No dislocation.    Soft tissues: Pelvic phleboliths.    Gastrointestinal tract:  Moderate amount of stool in the colon, correlate for   constipation.      Impression:       1.  Acute, superomedially displaced, mildly comminuted, oblique right femoral   fracture through the inferior trochanter to the subtrochanteric region    laterally.  2.  Moderate amount of stool in the colon, correlate for constipation.    THIS DOCUMENT HAS BEEN ELECTRONICALLY SIGNED BY RAMIN SOW MD    XR Chest 1 View [[id number]] Collected:  04/10/18 1907     Updated:  04/10/18 2038    Narrative:       EXAM:    XR Chest, 1 View    CLINICAL HISTORY:    89 years old, female; Signs and symptoms; Other: Probable hip FX; Pre-op    TECHNIQUE:    Frontal view of the chest.    COMPARISON:    No relevant prior studies available.    FINDINGS:    Lungs:  Unremarkable.  No consolidation.    Pleural space:  Unremarkable.  No pneumothorax.    Heart:  Unremarkable.  No cardiomegaly.    Mediastinum:  Unremarkable.    Bones/joints:  Thoracolumbar vertebroplasty.  Old, callused left lateral rib   fractures with mild chest wall deformity.  Dextrocurvature of the thoracic   spine, positioning versus mild scoliosis.      Impression:         No acute abnormality.    THIS DOCUMENT HAS BEEN ELECTRONICALLY SIGNED BY RAMIN SOW MD              Discharge Details      Leydi Agosto   Home Medication Instructions GISELLE:[id number]    Printed on:04/15/18 1229   Medication Information                      acetaminophen (TYLENOL) 325 MG tablet  Take 2 tablets by mouth Every 8 (Eight) Hours. Change to prn dosing as able.             atenolol (TENORMIN) 50 MG tablet  Take 50 mg by mouth Daily.             atorvastatin (LIPITOR) 20 MG tablet  Take 20 mg by mouth Daily.             bisacodyl (DULCOLAX) 10 MG suppository  Insert 1 suppository into the rectum Daily As Needed for Constipation.             clopidogrel (PLAVIX) 75 MG tablet  Take 75 mg by mouth Daily.             docusate sodium 100 MG capsule  Take 100 mg by mouth 2 (Two) Times a Day.             enoxaparin (LOVENOX) 30 MG/0.3ML solution syringe  Inject 0.3 mL under the skin Daily for 28 days.             HYDROcodone-acetaminophen (NORCO) 5-325 MG per tablet  Take 1 tablet by mouth Every 6 (Six) Hours As Needed for Severe Pain .              Multiple Vitamins-Minerals (MULTIVITAMIN WOMEN 50+ PO)  Take  by mouth.             polyethylene glycol (MIRALAX) powder  Take 17 g by mouth Daily.                   Discharge Disposition:  Skilled Nursing Facility (DC - External)    Discharge Diet:  Diet Instructions     Diet: Regular; Thin       Discharge Diet:  Regular    Fluid Consistency:  Thin          Discharge Activity:   Activity Instructions     Discharge Activity Restrictions       Weight-bear as tolerated right lower extremity for transfers only.  Touch weightbearing right lower extremity at all other times.          No future appointments.    Additional Instructions for the Follow-ups that You Need to Schedule     Discharge Follow-up with PCP    As directed      Follow Up Details:  1 week         Discharge Follow-up with Specialty: Yue; 2 Weeks    As directed      Specialty:  Yue    Follow Up:  2 Weeks         CBC & Differential     Apr 18, 2018 (Approximate)      Manual Differential:  No               Time Spent on Discharge:  35 minutes    Electronically signed by TERESITA Garcia, 04/15/18, 12:28 PM.        Electronically signed by TERESITA Garcia at 4/15/2018 12:42 PM

## 2018-04-15 NOTE — PLAN OF CARE
Problem: Patient Care Overview  Goal: Plan of Care Review  Outcome: Outcome(s) achieved Date Met: 04/15/18   04/15/18 1525   Coping/Psychosocial   Plan of Care Reviewed With patient   OTHER   Outcome Summary Patient alert, oriented. Denies SOA on RA, HA, N/V. Incontinent of bowel and bladder; last BM/void 1400 today. Residual of 50ml in bladder. Appetite fair, fluid intake adequate. Pain to R hip with movement/mobility. Proximal, mid and distal incisions approximated with staples, covered with xeroform and coverderm dressings. Surrounding tissue intact with +2 edema. Dressings changed r/t serous drainage with shreds of blood. No s/s infection noted. NB removed from right adductor canal: tip intact, insertion site CD with bleeding. Daughter/POA aware of patient transfer to assisted living at 3pm.    Plan of Care Review   Progress improving       Problem: Skin Injury Risk (Adult)  Goal: Identify Related Risk Factors and Signs and Symptoms  Outcome: Outcome(s) achieved Date Met: 04/15/18   04/15/18 1525   Skin Injury Risk (Adult)   Related Risk Factors (Skin Injury Risk) advanced age;edema;mobility impaired;moisture     Goal: Skin Health and Integrity  Outcome: Outcome(s) achieved Date Met: 04/15/18   04/15/18 1525   Skin Injury Risk (Adult)   Skin Health and Integrity achieves outcome       Problem: Fall Risk (Adult)  Goal: Identify Related Risk Factors and Signs and Symptoms  Outcome: Outcome(s) achieved Date Met: 04/15/18   04/15/18 1525   Fall Risk (Adult)   Related Risk Factors (Fall Risk) age-related changes;bladder function altered;gait/mobility problems;history of falls;impaired vision;sensory deficits;environment unfamiliar   Signs and Symptoms (Fall Risk) presence of risk factors     Goal: Absence of Fall  Outcome: Outcome(s) achieved Date Met: 04/15/18   04/15/18 1525   Fall Risk (Adult)   Absence of Fall achieves outcome       Problem: Fractured Hip (Adult)  Goal: Signs and Symptoms of Listed Potential  Problems Will be Absent, Minimized or Managed (Fractured Hip)  Outcome: Unable to achieve outcome(s) by discharge Date Met: 04/15/18   04/15/18 0521   Goal/Outcome Evaluation   Problems Assessed (Fractured Hip) all   Problems Present (Fractured Hip) pain;other (see comments)  (wound drainage)

## 2018-04-15 NOTE — PROGRESS NOTES
Continued Stay Note  [facility]     Patient Name: Leydi Agosto  MRN: [id number]  Today's Date: 4/15/2018    Admit Date: 4/10/2018          Discharge Plan     Row Name 04/15/18 1204       Plan    Plan [facility] ambulance rescheduled to 3:00 PM today    Plan Comments Spoke with Jessica at [facility] (P: [phone]) and rescheduled ambulance from 1:00 PM to 3:00 PM today per APRN and bedside RN's request. Discussed with Domingo Beaulieu and they said this was ok. Fatoumata Maguire RN [phone]              Discharge Codes    No documentation.       Expected Discharge Date and Time     Expected Discharge Date Expected Discharge Time    Apr 16, 2018             Fatoumata Maguire RN

## 2018-04-15 NOTE — PROGRESS NOTES
"/80   Pulse 75   Temp 97 °F (36.1 °C) (Tympanic)   Resp 18   Ht 160 cm (63\")   Wt 56.7 kg (125 lb 1.6 oz)   SpO2 90%   BMI 22.16 kg/m²     Lab Results (last 24 hours)     Procedure Component Value Units Date/Time    CBC & Differential [[id number]] Collected:  04/15/18 0537    Specimen:  Blood Updated:  04/15/18 0633    Narrative:       The following orders were created for panel order CBC & Differential.  Procedure                               Abnormality         Status                     ---------                               -----------         ------                     CBC Auto Differential[[id number]]        Abnormal            Final result                 Please view results for these tests on the individual orders.    CBC Auto Differential [[id number]]  (Abnormal) Collected:  04/15/18 0537    Specimen:  Blood Updated:  04/15/18 0633     WBC 7.50 10*3/mm3      RBC 2.88 (L) 10*6/mm3      Hemoglobin 8.1 (L) g/dL      Hematocrit 25.0 (L) %      MCV 86.8 fL      MCH 28.1 pg      MCHC 32.4 g/dL      RDW 17.6 (H) %      RDW-SD 55.5 (H) fl      MPV 10.0 fL      Platelets 154 10*3/mm3      Neutrophil % 69.6 %      Lymphocyte % 19.6 (L) %      Monocyte % 7.9 %      Eosinophil % 2.3 %      Basophil % 0.3 %      Immature Grans % 0.3 %      Neutrophils, Absolute 5.23 10*3/mm3      Lymphocytes, Absolute 1.47 10*3/mm3      Monocytes, Absolute 0.59 10*3/mm3      Eosinophils, Absolute 0.17 10*3/mm3      Basophils, Absolute 0.02 10*3/mm3      Immature Grans, Absolute 0.02 10*3/mm3           Imaging Results (last 24 hours)     ** No results found for the last 24 hours. **          Patient Care Team:  Perez Pedraza MD as PCP - General (Internal Medicine)    SUBJECTIVE: She reports she is feeling better.  Still having difficulty voiding on her own but this is a chronic issue.  She is sitting up in bed eating lunch.  Reports therapy has not worked with her today yet.  Limited ability to participate with therapy " yesterday.    PHYSICAL EXAM  Right hip coverderm dressings with minimal serosanguinous spotty drainage distal ones are dry and intact  Thigh and calf soft nontender  Neurovascular intact distally     Active Problems:    Urinary retention      PLAN / DISPOSITION: 89-year-old female postop day #4 status post right hip long trochanteric femoral nail by Dr. Mccurdy  -Okay from orthopedic standpoint to transfer to [facility] as scheduled today.  -Continue Lovenox for DVT prophylaxis.  -Continue daily dressing changes as Dr. Mccurdy has ordered.  -Continue to mobilize with therapy as tolerated  -Follow-up with Dr. Mccurdy as scheduled      Gabriel Flanagan MD  04/15/18  12:06 PM

## 2018-04-16 NOTE — PROGRESS NOTES
[facility]    Nerve Cath Post Op Call    Patient Name: Leydi Agosto  :  1928  MRN:  [id number]  Date of Discharge: 4/15/2018    Treatment Plan

## 2018-04-18 ENCOUNTER — RESULTS ENCOUNTER (OUTPATIENT)
Dept: TELEMETRY | Facility: HOSPITAL | Age: 83
End: 2018-04-18

## 2018-04-18 ENCOUNTER — TELEPHONE (OUTPATIENT)
Dept: ORTHOPEDIC SURGERY | Facility: CLINIC | Age: 83
End: 2018-04-18

## 2018-04-18 DIAGNOSIS — D64.9 ANEMIA, UNSPECIFIED TYPE: ICD-10-CM

## 2018-04-18 NOTE — TELEPHONE ENCOUNTER
Received a call from the rehab facility the patient is taking at. Patient had a RIGHT HIP TROCANTERIC NAILING WITH INTRAMEDULLARY HIP SCREW on 04/11/2018. Patient arrived at the facility on 04/15/18 and her incision looked good.   Yesterday morning the nurse noticed a hematoma place on her calf. It is very swollen, weeping, and blood draining from area. The incision is warm and has saturated the bandage, which has been changed 3 times.   I scheduled her for tomorrow at 3:30 with Dr. Walsh.

## 2018-04-19 ENCOUNTER — OFFICE VISIT (OUTPATIENT)
Dept: ORTHOPEDIC SURGERY | Facility: CLINIC | Age: 83
End: 2018-04-19

## 2018-04-19 DIAGNOSIS — M97.8XXA PERIPROSTHETIC FRACTURE OF FEMUR AT TIP OF PROSTHESIS, INITIAL ENCOUNTER: ICD-10-CM

## 2018-04-19 DIAGNOSIS — Z09 SURGERY FOLLOW-UP: Primary | ICD-10-CM

## 2018-04-19 DIAGNOSIS — S72.141D CLOSED DISPLACED INTERTROCHANTERIC FRACTURE OF RIGHT FEMUR WITH ROUTINE HEALING, SUBSEQUENT ENCOUNTER: ICD-10-CM

## 2018-04-19 DIAGNOSIS — Z96.649 PERIPROSTHETIC FRACTURE OF FEMUR AT TIP OF PROSTHESIS, INITIAL ENCOUNTER: ICD-10-CM

## 2018-04-19 PROCEDURE — 99024 POSTOP FOLLOW-UP VISIT: CPT | Performed by: ORTHOPAEDIC SURGERY

## 2018-04-19 RX ORDER — CEPHALEXIN 500 MG/1
CAPSULE ORAL
COMMUNITY
Start: 2018-02-20

## 2018-04-19 RX ORDER — SULFAMETHOXAZOLE AND TRIMETHOPRIM 800; 160 MG/1; MG/1
TABLET ORAL
COMMUNITY
Start: 2018-02-20

## 2018-04-19 NOTE — PROGRESS NOTES
[facility] Note    Subjective     Post-op (8 days s/p (R) Hip Trocanteric Nailing with Intramedullary Hip Screw 4/11/18)       HPI    Leydi Agosto is a 89 y.o. female. Patient is 8 days out from long trochanteric nail for the right hip on 4/11/2018.  She's been worked in today because of concerns of her nurse of a fluid collection at the right calf that is draining.  Patient is complaining of right hip pain.  She has not really gotten up very much.  Does not recall any new injuries or trauma.         Objective      Physical Exam  There were no vitals taken for this visit.    There is no height or weight on file to calculate BMI.        Ortho Exam  Peripheral Vascular  Lower Extremity    Musculoskeletal  Lower Extremity   Right Hip:     Assistive Device Used for Ambulation: Wheelchair    Inspection and palpation:    Tissue tension/texture is pliable and soft    Normal warmth    Incision--draining serous fluid but no purulence noted    Calf--soft and non-tender   Range of Motion    Gentle PROM of hip intact and not painful   Deformities/Postures/Misalignments/Discrepancies    No leg length discrepancy       Imaging  Imaging Results (last 24 hours)     Procedure Component Value Units Date/Time    XR Femur 2 View Right [[id number]] Resulted:  04/19/18 1706     Updated:  04/19/18 1709    Narrative:       Right Hip X-Ray    Indication: s/p ORIF    Views:  AP lateral views to include the entire implant.  Patient could not   tolerate a lateral x-ray of the hip    Comparison: Postoperative C-arm images    Findings:  There is a new fracture on the anterior and distal aspect of the femur   associated with the distal interlocking screws.  Compared to the immediate postoperative C-arm images, there is been no   significant change in the alignment of the proximal femur.  There is no   evidence of hip screw cut out.  The intramedullary vick remains within the intramedullary canal and the   distal femur  The  femoral component of the patient's knee arthroplasty remains well   seated with no signs of periprosthetic fracture    Impression: New fracture present in the distal femur associated with the   distal interlocking screws.  No significant change in the proximal femur   is noted.            Assessment:  1. Surgery follow-up    2. Closed displaced intertrochanteric fracture of right femur with routine healing, subsequent encounter    3. Periprosthetic fracture of femur at tip of prosthesis, initial encounter        Plan:  1. We've shared the findings of the fracture at the tip of the nail distally with the patient's daughter who lives in [place] and he was accompanying her today.  We have agreed to protect her weightbearing and basically allow her to get from bed to chair and chair to bed only.  She should be nonweightbearing at all other times.  We will see her back in a week to remove her staples.  Holding either her Plavix or Lovenox would be advisable to slowly with the drainage from the incisions.  2. There is no evidence of hip screw cut out proximally but I think we need to keep a close eye on the implant with x-rays every so often to make sure that this heals appropriately and does not cause further discomfort.  3. Patient will be placed in a knee immobilizer today for comfort as well.  4. Continue dry dressing changes to the right lower extremity at the calf.          Elie Walsh MD  04/19/18  5:41 PM

## 2018-04-19 NOTE — TELEPHONE ENCOUNTER
Have them call with any questions.  Please call them and let them know to hold off on further therapy until her fractures have stabalized.    SAV

## 2018-04-20 NOTE — TELEPHONE ENCOUNTER
Patient was seen yesterday and Dr. Walsh is allowing her to weightbear only to get from bed to chair and chair to bed. She should be nonweightbearing at all other times.

## 2018-04-26 ENCOUNTER — OFFICE VISIT (OUTPATIENT)
Dept: ORTHOPEDIC SURGERY | Facility: CLINIC | Age: 83
End: 2018-04-26

## 2018-04-26 DIAGNOSIS — S72.141D CLOSED DISPLACED INTERTROCHANTERIC FRACTURE OF RIGHT FEMUR WITH ROUTINE HEALING, SUBSEQUENT ENCOUNTER: Primary | ICD-10-CM

## 2018-04-26 DIAGNOSIS — Z96.649 PERIPROSTHETIC FRACTURE OF FEMUR AT TIP OF PROSTHESIS, SUBSEQUENT ENCOUNTER: ICD-10-CM

## 2018-04-26 DIAGNOSIS — M97.8XXD PERIPROSTHETIC FRACTURE OF FEMUR AT TIP OF PROSTHESIS, SUBSEQUENT ENCOUNTER: ICD-10-CM

## 2018-04-26 PROCEDURE — 99024 POSTOP FOLLOW-UP VISIT: CPT | Performed by: PHYSICIAN ASSISTANT

## 2018-04-26 NOTE — PROGRESS NOTES
[facility] [facility]    Subjective     Patient: Leydi Agosto  : 1928    Primary Care Provider: Perez Pedraza MD    Requesting Provider: As above    Post-op (1 week follow up, 2 weeks status post: Right Hip Trocanteric Nailing with Intramedullary Hip Screw 18 (suture removal))      History      History of Present Illness: Patient is two-week status post IM nailing up for right hip fracture with periprosthetic femur fracture at the distal screws.  She is here today for suture removal.  She is been compliant with using the immobilizer since she saw Dr. Walsh 1 week ago.  No new symptoms.    Current Outpatient Prescriptions on File Prior to Visit   Medication Sig Dispense Refill   • acetaminophen (TYLENOL) 325 MG tablet Take 2 tablets by mouth Every 8 (Eight) Hours. Change to prn dosing as able.     • atenolol (TENORMIN) 50 MG tablet Take 50 mg by mouth Daily.     • atorvastatin (LIPITOR) 20 MG tablet Take 20 mg by mouth Daily.     • bisacodyl (DULCOLAX) 10 MG suppository Insert 1 suppository into the rectum Daily As Needed for Constipation.     • cephalexin (KEFLEX) 500 MG capsule      • clopidogrel (PLAVIX) 75 MG tablet Take 75 mg by mouth Daily.     • docusate sodium 100 MG capsule Take 100 mg by mouth 2 (Two) Times a Day.     • enoxaparin (LOVENOX) 30 MG/0.3ML solution syringe Inject 0.3 mL under the skin Daily for 28 days.     • HYDROcodone-acetaminophen (NORCO) 5-325 MG per tablet Take 1 tablet by mouth Every 6 (Six) Hours As Needed for Severe Pain . 12 tablet 0   • Multiple Vitamins-Minerals (MULTIVITAMIN WOMEN 50+ PO) Take  by mouth.     • polyethylene glycol (MIRALAX) powder Take 17 g by mouth Daily.     • sulfamethoxazole-trimethoprim (BACTRIM DS,SEPTRA DS) 800-160 MG per tablet        No current facility-administered medications on file prior to visit.       No Known Allergies   Past Medical History:   Diagnosis Date   • Coronary artery disease    • Hypertension     • MI, old     2012     Past Surgical History:   Procedure Laterality Date   • CORONARY ANGIOPLASTY WITH STENT PLACEMENT      stents x 5   • HIP TROCANTERIC NAILING WITH INTRAMEDULLARY HIP SCREW Right 4/11/2018    Procedure: HIP TROCANTERIC NAILING WITH INTRAMEDULLARY HIP SCREW;  Surgeon: Elie Walsh MD;  Location: [facility];  Service: Orthopedics   • JOINT REPLACEMENT       History reviewed. No pertinent family history.   Social History     Social History   • Marital status:      Spouse name: N/A   • Number of children: N/A   • Years of education: N/A     Occupational History   • Not on file.     Social History Main Topics   • Smoking status: Never Smoker   • Smokeless tobacco: Never Used   • Alcohol use No   • Drug use: No   • Sexual activity: No     Other Topics Concern   • Not on file     Social History Narrative   • No narrative on file        Review of Systems    The following portions of the patient's history were reviewed and updated as appropriate: allergies, current medications, past family history, past medical history, past social history, past surgical history and problem list.      Objective      Physical Exam  There were no vitals taken for this visit.    There is no height or weight on file to calculate BMI.      Ortho Exam  RLE exam:  Incisions are healing well  Lower leg is bandaged  NVI distally    Medical Decision Making    Data Review:   none    Assessment:  No diagnosis found.    Plan:  Two-week status post IM nailing for right femur fracture with subsequent periprosthetic femur fracture at the distal nail.  Sutures were removed today.  I applied Xeroform and covered her arms over the incisions.  Patient will remain in her knee mild immobilizer as instructed.  She'll return in 2 weeks to see Dr. Walsh with repeat x-rays or sooner if needed.      Santos Hill MA  04/26/18  2:33 PM

## 2018-04-27 NOTE — PROGRESS NOTES
I have reviewed the notes, assessments, and/or procedures performed by Fatoumata Yusuf PA-C, I concur with her/his documentation of Leydi Agosto.

## 2018-05-10 ENCOUNTER — OFFICE VISIT (OUTPATIENT)
Dept: ORTHOPEDIC SURGERY | Facility: CLINIC | Age: 83
End: 2018-05-10

## 2018-05-10 DIAGNOSIS — Z96.649 PERIPROSTHETIC FRACTURE OF FEMUR AT TIP OF PROSTHESIS, SUBSEQUENT ENCOUNTER: ICD-10-CM

## 2018-05-10 DIAGNOSIS — M97.8XXD PERIPROSTHETIC FRACTURE OF FEMUR AT TIP OF PROSTHESIS, SUBSEQUENT ENCOUNTER: ICD-10-CM

## 2018-05-10 DIAGNOSIS — Z98.890 POST-OPERATIVE STATE: Primary | ICD-10-CM

## 2018-05-10 DIAGNOSIS — S72.141D CLOSED DISPLACED INTERTROCHANTERIC FRACTURE OF RIGHT FEMUR WITH ROUTINE HEALING, SUBSEQUENT ENCOUNTER: ICD-10-CM

## 2018-05-10 PROCEDURE — 99024 POSTOP FOLLOW-UP VISIT: CPT | Performed by: PHYSICIAN ASSISTANT

## 2018-05-10 NOTE — PROGRESS NOTES
[facility]    Subjective     Patient: Leydi Agosto  : 1928    Primary Care Provider: Perez Pedraza MD    Requesting Provider: As above    Follow-up of the Right Femur (2 week f/u/HIP TROCANTERIC NAILING WITH INTRAMEDULLARY HIP SCREW - Right  18)      History        History of Present Illness: Patient returns 1 month status post IM nailing of the right hip fracture with subsequent periprosthetic fracture at the distal nail.  She has been in a knee immobilizer.      Current Outpatient Prescriptions on File Prior to Visit   Medication Sig Dispense Refill   • acetaminophen (TYLENOL) 325 MG tablet Take 2 tablets by mouth Every 8 (Eight) Hours. Change to prn dosing as able.     • atenolol (TENORMIN) 50 MG tablet Take 50 mg by mouth Daily.     • atorvastatin (LIPITOR) 20 MG tablet Take 20 mg by mouth Daily.     • bisacodyl (DULCOLAX) 10 MG suppository Insert 1 suppository into the rectum Daily As Needed for Constipation.     • cephalexin (KEFLEX) 500 MG capsule      • clopidogrel (PLAVIX) 75 MG tablet Take 75 mg by mouth Daily.     • docusate sodium 100 MG capsule Take 100 mg by mouth 2 (Two) Times a Day.     • enoxaparin (LOVENOX) 30 MG/0.3ML solution syringe Inject 0.3 mL under the skin Daily for 28 days.     • HYDROcodone-acetaminophen (NORCO) 5-325 MG per tablet Take 1 tablet by mouth Every 6 (Six) Hours As Needed for Severe Pain . 12 tablet 0   • Multiple Vitamins-Minerals (MULTIVITAMIN WOMEN 50+ PO) Take  by mouth.     • polyethylene glycol (MIRALAX) powder Take 17 g by mouth Daily.     • sulfamethoxazole-trimethoprim (BACTRIM DS,SEPTRA DS) 800-160 MG per tablet        No current facility-administered medications on file prior to visit.       No Known Allergies   Past Medical History:   Diagnosis Date   • Coronary artery disease    • Hypertension    • MI, old          Past Surgical History:   Procedure Laterality Date   • CORONARY ANGIOPLASTY WITH STENT PLACEMENT       stents x 5   • HIP TROCANTERIC NAILING WITH INTRAMEDULLARY HIP SCREW Right 4/11/2018    Procedure: HIP TROCANTERIC NAILING WITH INTRAMEDULLARY HIP SCREW;  Surgeon: Elie Walsh MD;  Location: [facility];  Service: Orthopedics   • JOINT REPLACEMENT       Family History   Problem Relation Age of Onset   • No Known Problems Mother    • No Known Problems Father       Social History     Social History   • Marital status:      Spouse name: N/A   • Number of children: N/A   • Years of education: N/A     Occupational History   • Not on file.     Social History Main Topics   • Smoking status: Never Smoker   • Smokeless tobacco: Never Used   • Alcohol use No   • Drug use: No   • Sexual activity: No     Other Topics Concern   • Not on file     Social History Narrative   • No narrative on file        Review of Systems    The following portions of the patient's history were reviewed and updated as appropriate: allergies, current medications, past family history, past medical history, past social history, past surgical history and problem list.      Objective      Physical Exam  There were no vitals taken for this visit.    There is no height or weight on file to calculate BMI.      Ortho Exam  RLE exam:  Incisions well healed  Mildly tender at the fracture at distal nail  NVI distally    Medical Decision Making    Data Review:   ordered and reviewed x-rays today    Assessment:  1. Post-operative state    2. Periprosthetic fracture of femur at tip of prosthesis, subsequent encounter    3. Closed displaced intertrochanteric fracture of right femur with routine healing, subsequent encounter        Plan:  Doing well status post IM nailing of right intertrochanter fracture with subsequent periprosthetic fracture at the distal nail.  X-rays today show the inner stroke fracture has interval healing with no displacement of the periprosthetic fracture at the distal nail.  Patient is comfortable in the immobilizer.   Plan today is she continue using the knee immobilizer and continue with protected weightbearing only weight bearing from bed to chair.  She'll return in 1 month with repeat x-rays or sooner if needed.      Fatoumata Yusuf PA-C  05/11/18  9:33 AM

## 2018-06-07 ENCOUNTER — OFFICE VISIT (OUTPATIENT)
Dept: ORTHOPEDIC SURGERY | Facility: CLINIC | Age: 83
End: 2018-06-07

## 2018-06-07 DIAGNOSIS — Z96.649 PERIPROSTHETIC FRACTURE OF FEMUR AT TIP OF PROSTHESIS, SUBSEQUENT ENCOUNTER: ICD-10-CM

## 2018-06-07 DIAGNOSIS — M97.8XXD PERIPROSTHETIC FRACTURE OF FEMUR AT TIP OF PROSTHESIS, SUBSEQUENT ENCOUNTER: ICD-10-CM

## 2018-06-07 DIAGNOSIS — Z87.81 STATUS POST FRACTURE OF RIGHT HIP: Primary | ICD-10-CM

## 2018-06-07 PROCEDURE — 99024 POSTOP FOLLOW-UP VISIT: CPT | Performed by: PHYSICIAN ASSISTANT

## 2018-06-07 NOTE — PROGRESS NOTES
[facility]    Subjective     Patient: Leydi Agosto  : 1928    Primary Care Provider: Perez Pedraza MD    Requesting Provider: As above    Follow-up (4 weeks- S/P -HIP TROCANTERIC NAILING WITH INTRAMEDULLARY HIP SCREW - Right  18)      History    History of Present Illness: Patient returns 2 months status post IM nailing of the right hip fracture with subsequent periprosthetic fracture at the distal nail.  She has been in a knee immobilizer.  She is at [facility] and they have been taking the knee immobilizer off.  It is somewhat confusing as to whether she has been moving the knee with the knee immobilizer off.  He reports no new pain.    Current Outpatient Prescriptions on File Prior to Visit   Medication Sig Dispense Refill   • acetaminophen (TYLENOL) 325 MG tablet Take 2 tablets by mouth Every 8 (Eight) Hours. Change to prn dosing as able.     • atenolol (TENORMIN) 50 MG tablet Take 50 mg by mouth Daily.     • atorvastatin (LIPITOR) 20 MG tablet Take 20 mg by mouth Daily.     • bisacodyl (DULCOLAX) 10 MG suppository Insert 1 suppository into the rectum Daily As Needed for Constipation.     • cephalexin (KEFLEX) 500 MG capsule      • clopidogrel (PLAVIX) 75 MG tablet Take 75 mg by mouth Daily.     • docusate sodium 100 MG capsule Take 100 mg by mouth 2 (Two) Times a Day.     • HYDROcodone-acetaminophen (NORCO) 5-325 MG per tablet Take 1 tablet by mouth Every 6 (Six) Hours As Needed for Severe Pain . 12 tablet 0   • Multiple Vitamins-Minerals (MULTIVITAMIN WOMEN 50+ PO) Take  by mouth.     • polyethylene glycol (MIRALAX) powder Take 17 g by mouth Daily.     • sulfamethoxazole-trimethoprim (BACTRIM DS,SEPTRA DS) 800-160 MG per tablet        No current facility-administered medications on file prior to visit.       No Known Allergies   Past Medical History:   Diagnosis Date   • Coronary artery disease    • Hypertension    • MI, old          Past Surgical  History:   Procedure Laterality Date   • CORONARY ANGIOPLASTY WITH STENT PLACEMENT      stents x 5   • HIP TROCANTERIC NAILING WITH INTRAMEDULLARY HIP SCREW Right 4/11/2018    Procedure: HIP TROCANTERIC NAILING WITH INTRAMEDULLARY HIP SCREW;  Surgeon: Elie Walsh MD;  Location: [facility];  Service: Orthopedics   • JOINT REPLACEMENT       Family History   Problem Relation Age of Onset   • No Known Problems Mother    • No Known Problems Father       Social History     Social History   • Marital status:      Spouse name: N/A   • Number of children: N/A   • Years of education: N/A     Occupational History   • Not on file.     Social History Main Topics   • Smoking status: Never Smoker   • Smokeless tobacco: Never Used   • Alcohol use No   • Drug use: No   • Sexual activity: No     Other Topics Concern   • Not on file     Social History Narrative   • No narrative on file        Review of Systems   Musculoskeletal: Positive for arthralgias.       The following portions of the patient's history were reviewed and updated as appropriate: allergies, current medications, past family history, past medical history, past social history, past surgical history and problem list.      Objective      Physical Exam  There were no vitals taken for this visit.    There is no height or weight on file to calculate BMI.    Ortho Exam  RLE exam:  Nontender at the distal femur at periprosthetic fracture.    NVI  No calf tenderness  [facility]    Medical Decision Making    Data Review:   ordered and reviewed x-rays today    Assessment:  1. Status post fracture of right hip    2. Periprosthetic fracture of femur at tip of prosthesis, subsequent encounter        Plan:  Progressing status post IM nailing of right hip fracture with subsequent periprosthetic fracture at distal nail.  X-rays today show that the proximal femur fracture is healing and the distal femur periprosthetic fracture is stable with no displacement.   Recommendation today is that she continue with a knee immobilizer.  She may weight bear weight in the knee immobilizer but should not be flexing the knee at all.  I recommended that they keep the medial knee immobilizer on full time except for bathing.  She'll return in 6 weeks with repeat x-rays or sooner if needed.      Fatoumata Yusuf PA-C  06/08/18  11:33 AM

## 2018-06-08 ENCOUNTER — TELEPHONE (OUTPATIENT)
Dept: ORTHOPEDIC SURGERY | Facility: CLINIC | Age: 83
End: 2018-06-08

## 2018-06-08 NOTE — TELEPHONE ENCOUNTER
She is to leave the immobilizer on full time except for bathing.  She should not be bending the knee at all.  She can weight bear in the immobilizer.

## 2018-06-08 NOTE — TELEPHONE ENCOUNTER
PT'S REHAB FACILITY CALLED NEEDING ADDITIONAL DIRECTIONS ON WHEN / IF TO TAKE HER OUT OF THE IMMOBILIZER FOR THERAPY. PLEASE CALL CHARLINE BACK [phone].

## 2018-06-15 NOTE — PLAN OF CARE
Problem: Fractured Hip (Adult)  Goal: Signs and Symptoms of Listed Potential Problems Will be Absent, Minimized or Managed (Fractured Hip)  Outcome: Ongoing (interventions implemented as appropriate)         normal...

## 2018-07-19 ENCOUNTER — OFFICE VISIT (OUTPATIENT)
Dept: ORTHOPEDIC SURGERY | Facility: CLINIC | Age: 83
End: 2018-07-19

## 2018-07-19 VITALS — OXYGEN SATURATION: 100 % | HEIGHT: 63 IN | HEART RATE: 56 BPM | BODY MASS INDEX: 22.15 KG/M2 | WEIGHT: 125 LBS

## 2018-07-19 DIAGNOSIS — Z87.81 STATUS POST FRACTURE OF RIGHT HIP: Primary | ICD-10-CM

## 2018-07-19 DIAGNOSIS — M97.8XXD PERIPROSTHETIC FRACTURE OF FEMUR AT TIP OF PROSTHESIS, SUBSEQUENT ENCOUNTER: ICD-10-CM

## 2018-07-19 DIAGNOSIS — Z96.649 PERIPROSTHETIC FRACTURE OF FEMUR AT TIP OF PROSTHESIS, SUBSEQUENT ENCOUNTER: ICD-10-CM

## 2018-07-19 PROCEDURE — 99213 OFFICE O/P EST LOW 20 MIN: CPT | Performed by: PHYSICIAN ASSISTANT

## 2018-07-19 NOTE — PROGRESS NOTES
[facility] [place] [facility]    Subjective     Patient: Leydi Agosto  : 1928    Primary Care Provider: Perez Pedraza MD    Requesting Provider: As above    Post-op Follow-up (6 week f/u- 14 weeks S/P -HIP TROCANTERIC NAILING WITH INTRAMEDULLARY HIP SCREW - Right  18)      History    History of Present Illness: Patient returns today for follow-up status post IM nailing of right hip fracture followed by periprosthetic fracture at the distal screws.  Patient has been in a knee immobilizer full time.  She has not been weightbearing except for transfer.  She reports no pain.    Current Outpatient Prescriptions on File Prior to Visit   Medication Sig Dispense Refill   • acetaminophen (TYLENOL) 325 MG tablet Take 2 tablets by mouth Every 8 (Eight) Hours. Change to prn dosing as able.     • atenolol (TENORMIN) 50 MG tablet Take 50 mg by mouth Daily.     • atorvastatin (LIPITOR) 20 MG tablet Take 20 mg by mouth Daily.     • bisacodyl (DULCOLAX) 10 MG suppository Insert 1 suppository into the rectum Daily As Needed for Constipation.     • cephalexin (KEFLEX) 500 MG capsule      • clopidogrel (PLAVIX) 75 MG tablet Take 75 mg by mouth Daily.     • docusate sodium 100 MG capsule Take 100 mg by mouth 2 (Two) Times a Day.     • Multiple Vitamins-Minerals (MULTIVITAMIN WOMEN 50+ PO) Take  by mouth.     • polyethylene glycol (MIRALAX) powder Take 17 g by mouth Daily.     • sulfamethoxazole-trimethoprim (BACTRIM DS,SEPTRA DS) 800-160 MG per tablet        No current facility-administered medications on file prior to visit.       No Known Allergies   Past Medical History:   Diagnosis Date   • Coronary artery disease    • Hypertension    • MI, old          Past Surgical History:   Procedure Laterality Date   • CORONARY ANGIOPLASTY WITH STENT PLACEMENT      stents x 5   • HIP TROCANTERIC NAILING WITH INTRAMEDULLARY HIP SCREW Right 2018    Procedure: HIP TROCANTERIC NAILING WITH INTRAMEDULLARY HIP  "KYLE;  Surgeon: Elie Walsh MD;  Location: [facility];  Service: Orthopedics   • JOINT REPLACEMENT       Family History   Problem Relation Age of Onset   • No Known Problems Mother    • No Known Problems Father       Social History     Social History   • Marital status:      Spouse name: N/A   • Number of children: N/A   • Years of education: N/A     Occupational History   • Not on file.     Social History Main Topics   • Smoking status: Never Smoker   • Smokeless tobacco: Never Used   • Alcohol use No   • Drug use: No   • Sexual activity: No     Other Topics Concern   • Not on file     Social History Narrative   • No narrative on file        Review of Systems    The following portions of the patient's history were reviewed and updated as appropriate: allergies, current medications, past family history, past medical history, past social history, past surgical history and problem list.      Objective      Physical Exam  Pulse 56   Ht 160 cm (62.99\")   Wt 56.7 kg (125 lb)   SpO2 100%   BMI 22.15 kg/m²     Body mass index is 22.15 kg/m².    Ortho Exam  RLE exam:  Patient has no pain with hip rotation  Nontender at the distal femur  Intact hip abductor adductor and flexors  Neurovascular intact distally    Medical Decision Making    Data Review:   ordered and reviewed x-rays today    Assessment:  1. Status post fracture of right hip    2. Periprosthetic fracture of femur at tip of prosthesis, subsequent encounter        Plan:  Progressing status post IM nailing of right hip fracture followed by periprosthetic fracture at the distal screws.  X-rays today show that the fractures continued to heal.  On exam, patient is nontender.  Recommendation today is that she come out of the knee immobilizer and begin physical therapy working on strength and motion and gait training.  She'll return in 6 weeks with repeat x-rays or sooner if needed.    History, diagnosis and treatment plan discussed with Dr." Nico.        Fatoumata Yusuf PA-C  07/20/18  11:39 AM

## 2018-08-06 ENCOUNTER — HOSPITAL ENCOUNTER (EMERGENCY)
Facility: HOSPITAL | Age: 83
Discharge: NURSING, REHAB OR LONG-TERM CARE | End: 2018-08-06
Attending: EMERGENCY MEDICINE | Admitting: EMERGENCY MEDICINE

## 2018-08-06 VITALS
DIASTOLIC BLOOD PRESSURE: 78 MMHG | TEMPERATURE: 98.9 F | HEIGHT: 62 IN | RESPIRATION RATE: 17 BRPM | HEART RATE: 61 BPM | OXYGEN SATURATION: 98 % | WEIGHT: 111 LBS | SYSTOLIC BLOOD PRESSURE: 161 MMHG | BODY MASS INDEX: 20.43 KG/M2

## 2018-08-06 DIAGNOSIS — S81.812A SKIN TEAR OF LEFT LOWER LEG WITHOUT COMPLICATION, INITIAL ENCOUNTER: Primary | ICD-10-CM

## 2018-08-06 PROCEDURE — 99284 EMERGENCY DEPT VISIT MOD MDM: CPT

## 2018-08-06 NOTE — ED PROVIDER NOTES
Subjective   Patient presents to the emergency department via EMS after sustaining a contusion against the left lower leg with a subsequent deep skin tear to the same leg anteriorly.  Bleeding is controlled prior to arrival.  This skin tear was sustained during transfer from wheelchair to bed.  Furthermore, the patient has sustained similar skin tears to the right anterior lower leg which are healing.  Patient states that the 2 skin tears on her right leg have not happened today however, she cannot recall how many days old they are.  Shouldn't is alert and denies any history of falls        History provided by:  Patient   used: No    Laceration   Location:  Leg  Leg laceration location:  L lower leg  Length:  5  Depth:  Through dermis  Quality: avulsion    Bleeding: controlled    Time since incident:  1 hour  Laceration mechanism:  Blunt object  Pain details:     Severity:  No pain  Relieved by:  Pressure (first aid)  Ineffective treatments:  None tried      Review of Systems   Musculoskeletal: Positive for gait problem (needs assistance with transfers.). Negative for joint swelling, myalgias and neck pain.        Patient denies pain;  Denies fall      Skin:        Skin tear on LLE;  Hx of older skin tears on the RLE by similar mechanism at local resident home      All other systems reviewed and are negative.      Past Medical History:   Diagnosis Date   • Coronary artery disease    • Hypertension    • MI, old     2012       No Known Allergies    Past Surgical History:   Procedure Laterality Date   • CORONARY ANGIOPLASTY WITH STENT PLACEMENT      stents x 5   • HIP TROCANTERIC NAILING WITH INTRAMEDULLARY HIP SCREW Right 4/11/2018    Procedure: HIP TROCANTERIC NAILING WITH INTRAMEDULLARY HIP SCREW;  Surgeon: Elie Walsh MD;  Location: [facility];  Service: Orthopedics   • JOINT REPLACEMENT         Family History   Problem Relation Age of Onset   • No Known Problems Mother    • No Known  Problems Father        Social History     Social History   • Marital status:      Social History Main Topics   • Smoking status: Never Smoker   • Smokeless tobacco: Never Used   • Alcohol use No   • Drug use: No   • Sexual activity: No     Other Topics Concern   • Not on file           Objective   Physical Exam   Constitutional: She is oriented to person, place, and time. She appears well-developed and well-nourished. No distress.   HENT:   Head: Normocephalic and atraumatic.   Mouth/Throat: Oropharynx is clear and moist.   Eyes: Pupils are equal, round, and reactive to light.   Neck: Normal range of motion.   Muscular stiffness with passive rom     Cardiovascular: Normal rate and regular rhythm.    Pulmonary/Chest: Effort normal and breath sounds normal. No respiratory distress. She has no wheezes. She has no rales.   Abdominal: Soft. Bowel sounds are normal. She exhibits no distension.   Musculoskeletal: She exhibits no edema or tenderness.   LLE:   5cm flap-shaped deep skin tear on the anterior leg.  Proximal and distal joints are negative.       Neurological: She is alert and oriented to person, place, and time. No sensory deficit.   Skin: Skin is warm and dry. Capillary refill takes less than 2 seconds. No rash noted. She is not diaphoretic. No erythema. No pallor.   Psychiatric: She has a normal mood and affect. Her behavior is normal. Judgment and thought content normal.       Laceration Repair  Date/Time: 8/6/2018 5:28 PM  Performed by: LIAM WESTBROOK  Authorized by: CHEN MADERA     Consent:     Consent obtained:  Verbal    Consent given by:  Patient    Risks discussed:  Infection  Anesthesia (see MAR for exact dosages):     Anesthesia method:  None  Laceration details:     Location: LLE.    Length (cm):  5  Repair type:     Repair type:  Simple  Pre-procedure details:     Preparation:  Patient was prepped and draped in usual sterile fashion  Exploration:     Hemostasis achieved with:   "Direct pressure    Wound exploration: wound explored through full range of motion      Contaminated: no    Treatment:     Area cleansed with:  Saline (spray skin )    Amount of cleaning:  Extensive    Irrigation solution:  Sterile saline    Irrigation volume:  1000    Irrigation method:  Syringe    Visualized foreign bodies/material removed: no    Skin repair:     Repair method:  Steri-Strips and tissue adhesive    Number of Steri-Strips:  4  Approximation:     Approximation:  Loose    Vermilion border: well-aligned    Post-procedure details:     Dressing:  Sterile dressing    Patient tolerance of procedure:  Tolerated well, no immediate complications  Comments:      2 older skin tears on the right lower extremity (each approx 2cm in size on the upper right lower leg anteriorly)  were cleansed simultaneously with skin cleanser spray and the aged and retracted skin was depleted and fresh dressings were applied after rinsing with saline.               ED Course      No results found for this or any previous visit (from the past 24 hour(s)).  Note: In addition to lab results from this visit, the labs listed above may include labs taken at another facility or during a different encounter within the last 24 hours. Please correlate lab times with ED admission and discharge times for further clarification of the services performed during this visit.    No orders to display     Vitals:    08/06/18 1608 08/06/18 1700   BP: 161/75 173/80   Pulse: 64    Resp: 17    Temp: 98.9 °F (37.2 °C)    SpO2: 96% 98%   Weight: 50.3 kg (111 lb)    Height: 157.5 cm (62\")      Medications - No data to display  ECG/EMG Results (last 24 hours)     ** No results found for the last 24 hours. **                      [facility]      Final diagnoses:   Skin tear of left lower leg without complication, initial encounter            Gila Bernabe, TERESITA  08/06/18 1732    "

## 2018-08-06 NOTE — DISCHARGE INSTRUCTIONS
Keep the pressure dressing in place for the next 24 hours.  Then remove and do daily skin care.  Leave the Steri-Strips in place until they fall off. Thank you

## (undated) DEVICE — LEGGINGS, PAIR, 29X43, STERILE: Brand: MEDLINE

## (undated) DEVICE — Device

## (undated) DEVICE — OCCLUSIVE GAUZE PATCH,3% BISMUTH TRIBROMOPHENATE IN PETROLATUM BLEND: Brand: XEROFORM

## (undated) DEVICE — BIT DRL 3FLUT QC NDL PT 4.2X145MM STRL

## (undated) DEVICE — SPK10295 ORTHOPEDIC FRACTURE KIT: Brand: SPK10295 ORTHOPEDIC FRACTURE KIT

## (undated) DEVICE — AIRWY 90MM NO9

## (undated) DEVICE — CVR HNDL LT SURG ACCSSRY BLU STRL

## (undated) DEVICE — CANNULA,OXY,ADULT,SUPERSOFT,W/7'TUB,UC: Brand: MEDLINE

## (undated) DEVICE — INTENDED USE FOR SURGICAL MARKING ON INTACT SKIN, ALSO PROVIDES A PERMANENT METHOD OF IDENTIFYING OBJECTS IN THE OPERATING ROOM: Brand: WRITESITE® REGULAR TIP SKIN MARKER

## (undated) DEVICE — PROXIMATE RH ROTATING HEAD SKIN STAPLERS (35 WIDE) CONTAINS 35 STAINLESS STEEL STAPLES: Brand: PROXIMATE

## (undated) DEVICE — MEDI-VAC YANKAUER SUCTION HANDLE W/BULBOUS TIP: Brand: CARDINAL HEALTH

## (undated) DEVICE — NERVE BLOCK SUPPORT KIT/BLUE: Brand: MEDLINE INDUSTRIES, INC.

## (undated) DEVICE — SOL LR 1000ML

## (undated) DEVICE — DRSNG WND BORDR/ADHS NONADHR/GZ LF 2X2IN STRL

## (undated) DEVICE — GW FOR TROCH NAIL 3.2X400MM

## (undated) DEVICE — ENCORE® LATEX MICRO SIZE 8, STERILE LATEX POWDER-FREE SURGICAL GLOVE: Brand: ENCORE

## (undated) DEVICE — ANTIBACTERIAL UNDYED BRAIDED (POLYGLACTIN 910), SYNTHETIC ABSORBABLE SUTURE: Brand: COATED VICRYL

## (undated) DEVICE — PK MAJ FX HIP 10

## (undated) DEVICE — SNAP KOVER: Brand: UNBRANDED

## (undated) DEVICE — SUCTION CANISTER, 2500CC, RIGID: Brand: DEROYAL

## (undated) DEVICE — DRSNG SURG AQUACEL AG 9X15CM

## (undated) DEVICE — MEDI-VAC NON-CONDUCTIVE SUCTION TUBING: Brand: CARDINAL HEALTH